# Patient Record
Sex: MALE | Race: WHITE | Employment: OTHER | URBAN - METROPOLITAN AREA
[De-identification: names, ages, dates, MRNs, and addresses within clinical notes are randomized per-mention and may not be internally consistent; named-entity substitution may affect disease eponyms.]

---

## 2019-10-08 ENCOUNTER — OFFICE VISIT (OUTPATIENT)
Dept: FAMILY MEDICINE CLINIC | Age: 74
End: 2019-10-08
Payer: MEDICARE

## 2019-10-08 VITALS
SYSTOLIC BLOOD PRESSURE: 130 MMHG | BODY MASS INDEX: 36.65 KG/M2 | DIASTOLIC BLOOD PRESSURE: 80 MMHG | WEIGHT: 285.6 LBS | HEART RATE: 65 BPM | OXYGEN SATURATION: 97 % | HEIGHT: 74 IN | RESPIRATION RATE: 20 BRPM | TEMPERATURE: 97.9 F

## 2019-10-08 DIAGNOSIS — Z23 NEED FOR PNEUMOCOCCAL VACCINATION: ICD-10-CM

## 2019-10-08 DIAGNOSIS — E03.9 HYPOTHYROIDISM (ACQUIRED): ICD-10-CM

## 2019-10-08 DIAGNOSIS — K21.9 GERD WITHOUT ESOPHAGITIS: ICD-10-CM

## 2019-10-08 DIAGNOSIS — I48.20 CHRONIC ATRIAL FIBRILLATION (HCC): ICD-10-CM

## 2019-10-08 DIAGNOSIS — Z23 NEED FOR TDAP VACCINATION: ICD-10-CM

## 2019-10-08 DIAGNOSIS — F32.4 MAJOR DEPRESSION SINGLE EPISODE, IN PARTIAL REMISSION (HCC): ICD-10-CM

## 2019-10-08 DIAGNOSIS — E78.00 HYPERCHOLESTEROLEMIA: ICD-10-CM

## 2019-10-08 DIAGNOSIS — Z11.59 ENCOUNTER FOR HEPATITIS C SCREENING TEST FOR LOW RISK PATIENT: ICD-10-CM

## 2019-10-08 DIAGNOSIS — E11.9 TYPE 2 DIABETES MELLITUS WITHOUT COMPLICATION, WITHOUT LONG-TERM CURRENT USE OF INSULIN (HCC): Primary | ICD-10-CM

## 2019-10-08 DIAGNOSIS — Z23 NEED FOR SHINGLES VACCINE: ICD-10-CM

## 2019-10-08 DIAGNOSIS — Z00.00 PREVENTATIVE HEALTH CARE: ICD-10-CM

## 2019-10-08 LAB
ALT SERPL-CCNC: 20 U/L (ref 10–40)
ANION GAP SERPL CALCULATED.3IONS-SCNC: 16 MMOL/L (ref 3–16)
AST SERPL-CCNC: 21 U/L (ref 15–37)
BUN BLDV-MCNC: 16 MG/DL (ref 7–20)
CALCIUM SERPL-MCNC: 10.3 MG/DL (ref 8.3–10.6)
CHLORIDE BLD-SCNC: 100 MMOL/L (ref 99–110)
CHOLESTEROL, TOTAL: 116 MG/DL (ref 0–199)
CO2: 25 MMOL/L (ref 21–32)
CREAT SERPL-MCNC: 0.9 MG/DL (ref 0.8–1.3)
CREATININE URINE: 150.3 MG/DL (ref 39–259)
GFR AFRICAN AMERICAN: >60
GFR NON-AFRICAN AMERICAN: >60
GLUCOSE BLD-MCNC: 111 MG/DL (ref 70–99)
HCT VFR BLD CALC: 44.4 % (ref 40.5–52.5)
HDLC SERPL-MCNC: 37 MG/DL (ref 40–60)
HEMOGLOBIN: 14.4 G/DL (ref 13.5–17.5)
HEPATITIS C ANTIBODY INTERPRETATION: NORMAL
LDL CHOLESTEROL CALCULATED: 64 MG/DL
MCH RBC QN AUTO: 33.1 PG (ref 26–34)
MCHC RBC AUTO-ENTMCNC: 32.5 G/DL (ref 31–36)
MCV RBC AUTO: 101.7 FL (ref 80–100)
MICROALBUMIN UR-MCNC: <1.2 MG/DL
MICROALBUMIN/CREAT UR-RTO: NORMAL MG/G (ref 0–30)
PDW BLD-RTO: 13 % (ref 12.4–15.4)
PLATELET # BLD: 136 K/UL (ref 135–450)
PMV BLD AUTO: 11.5 FL (ref 5–10.5)
POTASSIUM SERPL-SCNC: 4.4 MMOL/L (ref 3.5–5.1)
RBC # BLD: 4.36 M/UL (ref 4.2–5.9)
SODIUM BLD-SCNC: 141 MMOL/L (ref 136–145)
TRIGL SERPL-MCNC: 75 MG/DL (ref 0–150)
TSH SERPL DL<=0.05 MIU/L-ACNC: 1.71 UIU/ML (ref 0.27–4.2)
VLDLC SERPL CALC-MCNC: 15 MG/DL
WBC # BLD: 4.9 K/UL (ref 4–11)

## 2019-10-08 PROCEDURE — G8427 DOCREV CUR MEDS BY ELIG CLIN: HCPCS | Performed by: FAMILY MEDICINE

## 2019-10-08 PROCEDURE — 3046F HEMOGLOBIN A1C LEVEL >9.0%: CPT | Performed by: FAMILY MEDICINE

## 2019-10-08 PROCEDURE — G8482 FLU IMMUNIZE ORDER/ADMIN: HCPCS | Performed by: FAMILY MEDICINE

## 2019-10-08 PROCEDURE — 3017F COLORECTAL CA SCREEN DOC REV: CPT | Performed by: FAMILY MEDICINE

## 2019-10-08 PROCEDURE — 4040F PNEUMOC VAC/ADMIN/RCVD: CPT | Performed by: FAMILY MEDICINE

## 2019-10-08 PROCEDURE — 36415 COLL VENOUS BLD VENIPUNCTURE: CPT | Performed by: FAMILY MEDICINE

## 2019-10-08 PROCEDURE — 2022F DILAT RTA XM EVC RTNOPTHY: CPT | Performed by: FAMILY MEDICINE

## 2019-10-08 PROCEDURE — 99203 OFFICE O/P NEW LOW 30 MIN: CPT | Performed by: FAMILY MEDICINE

## 2019-10-08 PROCEDURE — 1123F ACP DISCUSS/DSCN MKR DOCD: CPT | Performed by: FAMILY MEDICINE

## 2019-10-08 PROCEDURE — 1036F TOBACCO NON-USER: CPT | Performed by: FAMILY MEDICINE

## 2019-10-08 PROCEDURE — G8417 CALC BMI ABV UP PARAM F/U: HCPCS | Performed by: FAMILY MEDICINE

## 2019-10-08 RX ORDER — RIVAROXABAN 20 MG/1
20 TABLET, FILM COATED ORAL
Refills: 5 | COMMUNITY
Start: 2019-09-30 | End: 2019-11-29 | Stop reason: SDUPTHER

## 2019-10-08 RX ORDER — SIMVASTATIN 40 MG
40 TABLET ORAL NIGHTLY
COMMUNITY
Start: 2019-08-25 | End: 2021-07-21 | Stop reason: SDUPTHER

## 2019-10-08 RX ORDER — TIZANIDINE 4 MG/1
4 TABLET ORAL
COMMUNITY
Start: 2018-10-02 | End: 2019-10-08

## 2019-10-08 RX ORDER — SOTALOL HYDROCHLORIDE 80 MG/1
80 TABLET ORAL 2 TIMES DAILY
COMMUNITY
Start: 2019-08-30 | End: 2021-07-21 | Stop reason: SDUPTHER

## 2019-10-08 RX ORDER — EMPAGLIFLOZIN 25 MG/1
25 TABLET, FILM COATED ORAL DAILY
Refills: 4 | COMMUNITY
Start: 2019-09-30 | End: 2019-11-29 | Stop reason: SDUPTHER

## 2019-10-08 RX ORDER — GLIMEPIRIDE 2 MG/1
2 TABLET ORAL
COMMUNITY
Start: 2019-08-22 | End: 2019-11-29 | Stop reason: SDUPTHER

## 2019-10-08 RX ORDER — LEVOTHYROXINE SODIUM 0.05 MG/1
50 TABLET ORAL DAILY
Refills: 4 | COMMUNITY
Start: 2019-09-30 | End: 2019-11-29 | Stop reason: SDUPTHER

## 2019-10-08 RX ORDER — KETOCONAZOLE 20 MG/ML
SHAMPOO TOPICAL
COMMUNITY
End: 2019-10-08

## 2019-10-08 RX ORDER — ASCORBIC ACID 500 MG
500 TABLET ORAL
COMMUNITY
End: 2019-10-08

## 2019-10-08 RX ORDER — CITALOPRAM 40 MG/1
20 TABLET ORAL DAILY
COMMUNITY
Start: 2019-09-13 | End: 2019-11-29 | Stop reason: SDUPTHER

## 2019-10-08 RX ORDER — PANTOPRAZOLE SODIUM 20 MG/1
20 TABLET, DELAYED RELEASE ORAL DAILY
Refills: 0 | COMMUNITY
Start: 2019-09-30 | End: 2019-11-29 | Stop reason: SDUPTHER

## 2019-10-08 ASSESSMENT — PATIENT HEALTH QUESTIONNAIRE - PHQ9
SUM OF ALL RESPONSES TO PHQ9 QUESTIONS 1 & 2: 0
1. LITTLE INTEREST OR PLEASURE IN DOING THINGS: 0
SUM OF ALL RESPONSES TO PHQ QUESTIONS 1-9: 0
2. FEELING DOWN, DEPRESSED OR HOPELESS: 0
SUM OF ALL RESPONSES TO PHQ QUESTIONS 1-9: 0

## 2019-10-08 ASSESSMENT — ENCOUNTER SYMPTOMS
WHEEZING: 0
BLOOD IN STOOL: 0
RHINORRHEA: 0
SORE THROAT: 0
COUGH: 0
VOMITING: 0
CONSTIPATION: 0
SHORTNESS OF BREATH: 0
DIARRHEA: 0
NAUSEA: 0
ABDOMINAL PAIN: 0

## 2019-10-09 LAB
ESTIMATED AVERAGE GLUCOSE: 137 MG/DL
HBA1C MFR BLD: 6.4 %

## 2019-11-29 RX ORDER — LEVOTHYROXINE SODIUM 0.05 MG/1
TABLET ORAL
Qty: 90 TABLET | Refills: 0 | Status: SHIPPED | OUTPATIENT
Start: 2019-11-29 | End: 2020-01-13

## 2019-11-29 RX ORDER — RIVAROXABAN 20 MG/1
TABLET, FILM COATED ORAL
Qty: 90 TABLET | Refills: 0 | Status: SHIPPED | OUTPATIENT
Start: 2019-11-29 | End: 2020-03-03

## 2019-11-29 RX ORDER — EMPAGLIFLOZIN 25 MG/1
TABLET, FILM COATED ORAL
Qty: 90 TABLET | Refills: 0 | Status: SHIPPED | OUTPATIENT
Start: 2019-11-29 | End: 2020-01-13

## 2019-11-29 RX ORDER — CITALOPRAM 40 MG/1
TABLET ORAL
Qty: 45 TABLET | Refills: 0 | Status: SHIPPED | OUTPATIENT
Start: 2019-11-29 | End: 2020-01-13

## 2019-11-29 RX ORDER — PANTOPRAZOLE SODIUM 20 MG/1
TABLET, DELAYED RELEASE ORAL
Qty: 90 TABLET | Refills: 0 | Status: SHIPPED | OUTPATIENT
Start: 2019-11-29 | End: 2020-04-23

## 2019-11-29 RX ORDER — GLIMEPIRIDE 2 MG/1
TABLET ORAL
Qty: 90 TABLET | Refills: 0 | Status: SHIPPED | OUTPATIENT
Start: 2019-11-29 | End: 2020-01-13

## 2020-02-11 ENCOUNTER — OFFICE VISIT (OUTPATIENT)
Dept: FAMILY MEDICINE CLINIC | Age: 75
End: 2020-02-11
Payer: MEDICARE

## 2020-02-11 VITALS
BODY MASS INDEX: 37.09 KG/M2 | WEIGHT: 289 LBS | SYSTOLIC BLOOD PRESSURE: 120 MMHG | HEIGHT: 74 IN | DIASTOLIC BLOOD PRESSURE: 80 MMHG

## 2020-02-11 LAB — HBA1C MFR BLD: 6.6 %

## 2020-02-11 PROCEDURE — G8427 DOCREV CUR MEDS BY ELIG CLIN: HCPCS | Performed by: FAMILY MEDICINE

## 2020-02-11 PROCEDURE — G8417 CALC BMI ABV UP PARAM F/U: HCPCS | Performed by: FAMILY MEDICINE

## 2020-02-11 PROCEDURE — 1036F TOBACCO NON-USER: CPT | Performed by: FAMILY MEDICINE

## 2020-02-11 PROCEDURE — G8482 FLU IMMUNIZE ORDER/ADMIN: HCPCS | Performed by: FAMILY MEDICINE

## 2020-02-11 PROCEDURE — 99214 OFFICE O/P EST MOD 30 MIN: CPT | Performed by: FAMILY MEDICINE

## 2020-02-11 PROCEDURE — 3017F COLORECTAL CA SCREEN DOC REV: CPT | Performed by: FAMILY MEDICINE

## 2020-02-11 PROCEDURE — 4040F PNEUMOC VAC/ADMIN/RCVD: CPT | Performed by: FAMILY MEDICINE

## 2020-02-11 PROCEDURE — 1123F ACP DISCUSS/DSCN MKR DOCD: CPT | Performed by: FAMILY MEDICINE

## 2020-02-11 PROCEDURE — 2022F DILAT RTA XM EVC RTNOPTHY: CPT | Performed by: FAMILY MEDICINE

## 2020-02-11 PROCEDURE — 3044F HG A1C LEVEL LT 7.0%: CPT | Performed by: FAMILY MEDICINE

## 2020-02-11 PROCEDURE — 83036 HEMOGLOBIN GLYCOSYLATED A1C: CPT | Performed by: FAMILY MEDICINE

## 2020-03-02 ENCOUNTER — TELEPHONE (OUTPATIENT)
Dept: FAMILY MEDICINE CLINIC | Age: 75
End: 2020-03-02

## 2020-03-06 ENCOUNTER — TELEPHONE (OUTPATIENT)
Dept: FAMILY MEDICINE CLINIC | Age: 75
End: 2020-03-06

## 2020-06-09 NOTE — PROGRESS NOTES
Negative for chest pain. Endocrine: Negative for polydipsia and polyuria. /82 (Site: Left Upper Arm)   Ht 6' 2\" (1.88 m)   Wt 288 lb (130.6 kg)   BMI 36.98 kg/m²    Objective:   Physical Exam  Vitals signs reviewed. Constitutional:       General: He is not in acute distress. Appearance: He is well-developed. HENT:      Head: Normocephalic. Right Ear: External ear normal.      Left Ear: External ear normal.   Neck:      Thyroid: No thyromegaly. Vascular: No carotid bruit or JVD. Cardiovascular:      Rate and Rhythm: Normal rate and regular rhythm. Heart sounds: Normal heart sounds. No murmur. Pulmonary:      Effort: Pulmonary effort is normal.      Breath sounds: Normal breath sounds. No wheezing or rales. Abdominal:      Comments: Obese. Lymphadenopathy:      Cervical: No cervical adenopathy. Neurological:      Mental Status: He is alert and oriented to person, place, and time. Assessment:      Type 2 Diabetes  Hyperlipidemia  Hypothyroidism   Depression  Anxiety  GERD  Chronic Atrial Fibrillation   Obesity       Plan:      HgbA1C was 6.5 today (down from 6.6 last time)  Reminded patient to have an eye exam  Refilled medication  Reminded patient to have a Colonoscopy   Reminded patient to have an AWV with our nurse.     RTO 4 months for Diabetes         VIPIN LANDIN DO

## 2020-06-11 ENCOUNTER — OFFICE VISIT (OUTPATIENT)
Dept: FAMILY MEDICINE CLINIC | Age: 75
End: 2020-06-11
Payer: MEDICARE

## 2020-06-11 VITALS
HEIGHT: 74 IN | WEIGHT: 288 LBS | SYSTOLIC BLOOD PRESSURE: 124 MMHG | BODY MASS INDEX: 36.96 KG/M2 | DIASTOLIC BLOOD PRESSURE: 82 MMHG

## 2020-06-11 LAB — HBA1C MFR BLD: 6.5 %

## 2020-06-11 PROCEDURE — 4040F PNEUMOC VAC/ADMIN/RCVD: CPT | Performed by: FAMILY MEDICINE

## 2020-06-11 PROCEDURE — G8427 DOCREV CUR MEDS BY ELIG CLIN: HCPCS | Performed by: FAMILY MEDICINE

## 2020-06-11 PROCEDURE — 3044F HG A1C LEVEL LT 7.0%: CPT | Performed by: FAMILY MEDICINE

## 2020-06-11 PROCEDURE — 99214 OFFICE O/P EST MOD 30 MIN: CPT | Performed by: FAMILY MEDICINE

## 2020-06-11 PROCEDURE — 83036 HEMOGLOBIN GLYCOSYLATED A1C: CPT | Performed by: FAMILY MEDICINE

## 2020-06-11 PROCEDURE — 1036F TOBACCO NON-USER: CPT | Performed by: FAMILY MEDICINE

## 2020-06-11 PROCEDURE — 3017F COLORECTAL CA SCREEN DOC REV: CPT | Performed by: FAMILY MEDICINE

## 2020-06-11 PROCEDURE — 1123F ACP DISCUSS/DSCN MKR DOCD: CPT | Performed by: FAMILY MEDICINE

## 2020-06-11 PROCEDURE — G8417 CALC BMI ABV UP PARAM F/U: HCPCS | Performed by: FAMILY MEDICINE

## 2020-06-11 PROCEDURE — 2022F DILAT RTA XM EVC RTNOPTHY: CPT | Performed by: FAMILY MEDICINE

## 2020-06-11 RX ORDER — LEVOTHYROXINE SODIUM 0.05 MG/1
TABLET ORAL
Qty: 90 TABLET | Refills: 1 | Status: SHIPPED | OUTPATIENT
Start: 2020-06-11 | End: 2020-12-10

## 2020-06-11 RX ORDER — EMPAGLIFLOZIN 25 MG/1
TABLET, FILM COATED ORAL
Qty: 90 TABLET | Refills: 1 | Status: SHIPPED | OUTPATIENT
Start: 2020-06-11 | End: 2020-12-28 | Stop reason: SDUPTHER

## 2020-06-11 RX ORDER — CITALOPRAM 40 MG/1
TABLET ORAL
Qty: 45 TABLET | Refills: 1 | Status: SHIPPED | OUTPATIENT
Start: 2020-06-11 | End: 2021-02-23

## 2020-06-11 RX ORDER — PANTOPRAZOLE SODIUM 20 MG/1
TABLET, DELAYED RELEASE ORAL
Qty: 90 TABLET | Refills: 1 | Status: SHIPPED | OUTPATIENT
Start: 2020-06-11 | End: 2020-07-27

## 2020-06-11 RX ORDER — GLIMEPIRIDE 2 MG/1
TABLET ORAL
Qty: 90 TABLET | Refills: 1 | Status: SHIPPED | OUTPATIENT
Start: 2020-06-11 | End: 2020-09-04

## 2020-07-27 ENCOUNTER — TELEPHONE (OUTPATIENT)
Dept: FAMILY MEDICINE CLINIC | Age: 75
End: 2020-07-27

## 2020-07-27 RX ORDER — TIZANIDINE 4 MG/1
4 TABLET ORAL EVERY 8 HOURS PRN
Qty: 30 TABLET | Refills: 0 | Status: SHIPPED | OUTPATIENT
Start: 2020-07-27 | End: 2020-08-03

## 2020-07-27 NOTE — TELEPHONE ENCOUNTER
Pt called to ask Dr Vega Reason to prescribe a muscle relaxer. He has had a stiff neck for eight days. He tried hot and cold but it is not working. Please give pt a call  [phone].  [address] [phone]

## 2020-08-03 RX ORDER — TIZANIDINE 4 MG/1
TABLET ORAL
Qty: 30 TABLET | Refills: 0 | Status: SHIPPED | OUTPATIENT
Start: 2020-08-03 | End: 2020-08-10

## 2020-08-10 RX ORDER — TIZANIDINE 4 MG/1
TABLET ORAL
Qty: 30 TABLET | Refills: 0 | Status: SHIPPED | OUTPATIENT
Start: 2020-08-10 | End: 2020-08-17

## 2020-08-17 RX ORDER — TIZANIDINE 4 MG/1
TABLET ORAL
Qty: 30 TABLET | Refills: 0 | Status: SHIPPED | OUTPATIENT
Start: 2020-08-17 | End: 2020-10-06

## 2020-09-04 RX ORDER — GLIMEPIRIDE 2 MG/1
TABLET ORAL
Qty: 90 TABLET | Refills: 1 | Status: SHIPPED | OUTPATIENT
Start: 2020-09-04 | End: 2021-02-10

## 2020-09-21 ENCOUNTER — NURSE TRIAGE (OUTPATIENT)
Dept: OTHER | Facility: CLINIC | Age: 75
End: 2020-09-21

## 2020-09-21 NOTE — TELEPHONE ENCOUNTER
Reason for Disposition   Redness of the skin and no fever    Answer Assessment - Initial Assessment Questions  1. ONSET: \"When did the pain start? \"       yesterday  2. LOCATION: \"Where is the pain located? \"       Left foot  3. PAIN: \"How bad is the pain? \"    (Scale 1-10; or mild, moderate, severe)   - MILD (1-3): doesn't interfere with normal activities    - MODERATE (4-7): interferes with normal activities (e.g., work or school) or awakens from sleep, limping    - SEVERE (8-10): excruciating pain, unable to do any normal activities, unable to walk       7-8 yesterday, 3 today  4. WORK OR EXERCISE: \"Has there been any recent work or exercise that involved this part of the body? \"       *No Answer*  5. CAUSE: \"What do you think is causing the ankle pain? \"      *No Answer*  6. OTHER SYMPTOMS: \"Do you have any other symptoms? \" (e.g., calf pain, rash, fever, swelling)      swelling  7. PREGNANCY: \"Is there any chance you are pregnant? \" \"When was your last menstrual period? \"      *No Answer*    Protocols used: ANKLE PAIN-ADULT-[place]    Call transferred to [facility]

## 2020-09-22 ENCOUNTER — OFFICE VISIT (OUTPATIENT)
Dept: FAMILY MEDICINE CLINIC | Age: 75
End: 2020-09-22
Payer: MEDICARE

## 2020-09-22 VITALS
DIASTOLIC BLOOD PRESSURE: 80 MMHG | TEMPERATURE: 97.7 F | WEIGHT: 289.4 LBS | HEIGHT: 74 IN | BODY MASS INDEX: 37.14 KG/M2 | SYSTOLIC BLOOD PRESSURE: 132 MMHG

## 2020-09-22 PROBLEM — E66.01 MORBIDLY OBESE (HCC): Status: ACTIVE | Noted: 2020-09-22

## 2020-09-22 LAB — URIC ACID, SERUM: 4.6 MG/DL (ref 3.5–7.2)

## 2020-09-22 PROCEDURE — 36415 COLL VENOUS BLD VENIPUNCTURE: CPT | Performed by: FAMILY MEDICINE

## 2020-09-22 PROCEDURE — 3017F COLORECTAL CA SCREEN DOC REV: CPT | Performed by: FAMILY MEDICINE

## 2020-09-22 PROCEDURE — G8427 DOCREV CUR MEDS BY ELIG CLIN: HCPCS | Performed by: FAMILY MEDICINE

## 2020-09-22 PROCEDURE — 1036F TOBACCO NON-USER: CPT | Performed by: FAMILY MEDICINE

## 2020-09-22 PROCEDURE — 1123F ACP DISCUSS/DSCN MKR DOCD: CPT | Performed by: FAMILY MEDICINE

## 2020-09-22 PROCEDURE — 99213 OFFICE O/P EST LOW 20 MIN: CPT | Performed by: FAMILY MEDICINE

## 2020-09-22 PROCEDURE — 4040F PNEUMOC VAC/ADMIN/RCVD: CPT | Performed by: FAMILY MEDICINE

## 2020-09-22 PROCEDURE — G8417 CALC BMI ABV UP PARAM F/U: HCPCS | Performed by: FAMILY MEDICINE

## 2020-09-22 RX ORDER — PANTOPRAZOLE SODIUM 20 MG/1
TABLET, DELAYED RELEASE ORAL
Qty: 90 TABLET | Refills: 0 | Status: CANCELLED | OUTPATIENT
Start: 2020-09-22

## 2020-09-22 RX ORDER — SIMVASTATIN 40 MG
40 TABLET ORAL NIGHTLY
Qty: 30 TABLET | Refills: 2 | Status: CANCELLED | OUTPATIENT
Start: 2020-09-22

## 2020-09-22 ASSESSMENT — ENCOUNTER SYMPTOMS
ABDOMINAL PAIN: 0
RHINORRHEA: 0
COLOR CHANGE: 1
NAUSEA: 0
DIARRHEA: 0
VOMITING: 0
SORE THROAT: 0

## 2020-09-22 NOTE — PROGRESS NOTES
2020     Magalis Paz (:  1945) is a 70 y.o. male, here for evaluation of the following medical concerns:    HPI     Patient presented for left ankle pain, unsure how he injured it, feels like sprained ankle, patient has pain with movement, extension of left foot, patient points to the anterior lateral aspect of his foot where the majority of the pain is located. The patient denied a history of gout but acknowledged edema and erythema extending into this foot. Today, denied chest pain, sob,n v, or diarrhea. Review of Systems   Constitutional: Negative for activity change, fatigue, fever and unexpected weight change. HENT: Negative for congestion, ear pain, rhinorrhea and sore throat. Cardiovascular: Negative for chest pain, palpitations and leg swelling. Gastrointestinal: Negative for abdominal pain, diarrhea, nausea and vomiting. Musculoskeletal: Positive for arthralgias, gait problem and joint swelling. Skin: Positive for color change. Negative for rash. Prior to Visit Medications    Medication Sig Taking?  Authorizing Provider   glimepiride (AMARYL) 2 MG tablet TAKE 1 TABLET BY MOUTH EVERY DAY Yes Miriam Dowell,    tiZANidine (ZANAFLEX) 4 MG tablet TAKE 1 TABLET BY MOUTH EVERY 8 HOURS AS NEEDED FOR SPASM Yes Marcin Sousa DO   pantoprazole (PROTONIX) 20 MG tablet TAKE 1 TABLET BY MOUTH EVERY NIGHT AT BEDTIME Yes Ben Sousa DO   citalopram (CELEXA) 40 MG tablet TAKE 1/2 TABLET BY MOUTH EVERY DAY Yes Ben Sousa DO   rivaroxaban (XARELTO) 20 MG TABS tablet TAKE 1 TABLET BY MOUTH EVERY DAY Yes Ben Sousa DO   levothyroxine (SYNTHROID) 50 MCG tablet TAKE 1 TABLET BY MOUTH EVERY DAY Yes Marcin Sousa DO   empagliflozin (JARDIANCE) 25 MG tablet TAKE 1 TABLET BY MOUTH EVERY DAY Yes Marcin Sousa, DO   blood glucose test strips (ASCENSIA AUTODISC VI;ONE TOUCH ULTRA TEST VI) strip Test blood sugar qd dx code E11.9 non insulin dept Yes Historical Provider, MD   simvastatin (ZOCOR) 40 MG tablet Take 40 mg by mouth nightly  Yes Historical Provider, MD   sotalol (BETAPACE) 80 MG tablet Take 80 mg by mouth 2 times daily  Yes Historical Provider, MD        Social History     Tobacco Use    Smoking status: Never Smoker    Smokeless tobacco: Never Used   Substance Use Topics    Alcohol use: Yes     Comment: Occasional        Vitals:    09/22/20 1459   BP: 132/80   Temp: 97.7 °F (36.5 °C)   Weight: 289 lb 6.4 oz (131.3 kg)   Height: 6' 2\" (1.88 m)     Estimated body mass index is 37.16 kg/m² as calculated from the following:    Height as of this encounter: 6' 2\" (1.88 m). Weight as of this encounter: 289 lb 6.4 oz (131.3 kg). Physical Exam  Vitals signs and nursing note reviewed. HENT:      Head: Normocephalic and atraumatic. Right Ear: External ear normal.      Left Ear: External ear normal.   Cardiovascular:      Heart sounds: Normal heart sounds. No murmur. Pulmonary:      Breath sounds: Normal breath sounds. No wheezing. Musculoskeletal:         General: Swelling and tenderness present. Comments: Left ankle swelling  Neg. Drawer test  Pain with palpation in the area of anterior talofibular ligament    Neurological:      Mental Status: He is alert and oriented to person, place, and time. Psychiatric:         Behavior: Behavior normal.         ASSESSMENT/PLAN:  1. Acute left ankle pain  Patient will use otc medication  he was educated on the medication and its use. he will  Use ice, heat, and stretching. he was shown stretching exercises. he will push fluids and rest.   RTC if not improved. - Uric Acid      Return for with pcp if not improved.

## 2020-10-06 NOTE — PROGRESS NOTES
Subjective:      Patient ID: Jacob Spence is a 70 y.o. male. Diabetes   He presents for his follow-up diabetic visit. He has type 2 diabetes mellitus. His disease course has been stable. There are no hypoglycemic associated symptoms. Pertinent negatives for diabetes include no chest pain, no foot paresthesias, no foot ulcerations, no polydipsia and no polyuria. There are no hypoglycemic complications. Symptoms are stable. There are no diabetic complications. Risk factors for coronary artery disease include diabetes mellitus, dyslipidemia, family history, male sex and obesity. Current diabetic treatment includes oral agent (dual therapy). He is compliant with treatment all of the time. His weight is stable. He is following a generally healthy diet. He participates in exercise intermittently. Home blood sugar record trend: Patient has not been testing his sugars. An ACE inhibitor/angiotensin II receptor blocker is not being taken. Eye exam is not current. Hyperlipidemia:  Patient is tolerating and compliant with Simvastatin 40 mg daily. He is fasting today for a lipid recheck. Hypothyroidism:  Patient is tolerating and compliant with Synthroid 50 mcg daily. He is due today for a TSH recheck. Depression / Anxiety: Patient is tolerating and compliant with Celexa 40 mg 1/2 daily. He feels that the medication is helping with his symptoms. He is sleeping well and denies any suicidal ideation. He feels that he still needs to be on the medication. GERD:  Patient is tolerating and compliant with Protonix 20 mg daily. He feels that the medication completely controls his symptoms. Chronic Atrial Fibrillation:  Patient sees Dr. Aylin Garcia in [place] and is S/P pacemaker insertion. He takes Xarelto 20 mg daily and Betapace 80 mg BID. Obesity:  Patient weighed 288 at his visit on 6-11-20. He has lost 4 lbs since then. Review of Systems   Cardiovascular: Negative for chest pain. Endocrine: Negative for polydipsia and polyuria. /74 (Site: Right Upper Arm)   Temp 97.4 °F (36.3 °C) (Infrared)   Ht 6' 2\" (1.88 m)   Wt 284 lb (128.8 kg)   BMI 36.46 kg/m²    Objective:   Physical Exam  Vitals signs reviewed. Constitutional:       General: He is not in acute distress. Appearance: He is well-developed. HENT:      Head: Normocephalic. Right Ear: External ear normal.      Left Ear: External ear normal.   Neck:      Thyroid: No thyromegaly. Vascular: No carotid bruit or JVD. Cardiovascular:      Rate and Rhythm: Normal rate and regular rhythm. Heart sounds: Normal heart sounds. No murmur. Pulmonary:      Effort: Pulmonary effort is normal.      Breath sounds: Normal breath sounds. No wheezing or rales. Abdominal:      Comments: Obese. Lymphadenopathy:      Cervical: No cervical adenopathy. Neurological:      Mental Status: He is alert and oriented to person, place, and time. Comments: Bilateral foot exam shows no lesions. Bilateral monofilament exam is normal.           Assessment:      Type 2 Diabetes  Hyperlipidemia  Hypothyroidism   Depression  Anxiety  GERD  Chronic Atrial Fibrillation   Obesity       Plan:      HgbA1C, Chem 7, Lipid Panel, AST, ALT, TSH  Urine Microalbumin  Foot Exam Done  Reminded patient to have an eye exam  Refilled medication  Flu Shot Given  Reminded patient to have a Colonoscopy   Reminded patient to have an AWV with our nurse.     RTO 4 months for Diabetes         VIPIN LANDIN DO

## 2020-10-08 ENCOUNTER — OFFICE VISIT (OUTPATIENT)
Dept: FAMILY MEDICINE CLINIC | Age: 75
End: 2020-10-08
Payer: MEDICARE

## 2020-10-08 VITALS
HEIGHT: 74 IN | DIASTOLIC BLOOD PRESSURE: 74 MMHG | TEMPERATURE: 97.4 F | SYSTOLIC BLOOD PRESSURE: 116 MMHG | BODY MASS INDEX: 36.45 KG/M2 | WEIGHT: 284 LBS

## 2020-10-08 LAB
ALT SERPL-CCNC: 24 U/L (ref 10–40)
ANION GAP SERPL CALCULATED.3IONS-SCNC: 14 MMOL/L (ref 3–16)
AST SERPL-CCNC: 21 U/L (ref 15–37)
BUN BLDV-MCNC: 12 MG/DL (ref 7–20)
CALCIUM SERPL-MCNC: 10.2 MG/DL (ref 8.3–10.6)
CHLORIDE BLD-SCNC: 99 MMOL/L (ref 99–110)
CHOLESTEROL, TOTAL: 126 MG/DL (ref 0–199)
CO2: 25 MMOL/L (ref 21–32)
CREAT SERPL-MCNC: 0.9 MG/DL (ref 0.8–1.3)
GFR AFRICAN AMERICAN: >60
GFR NON-AFRICAN AMERICAN: >60
GLUCOSE BLD-MCNC: 134 MG/DL (ref 70–99)
HDLC SERPL-MCNC: 34 MG/DL (ref 40–60)
LDL CHOLESTEROL CALCULATED: 70 MG/DL
POTASSIUM SERPL-SCNC: 4.5 MMOL/L (ref 3.5–5.1)
SODIUM BLD-SCNC: 138 MMOL/L (ref 136–145)
TRIGL SERPL-MCNC: 109 MG/DL (ref 0–150)
TSH SERPL DL<=0.05 MIU/L-ACNC: 3.2 UIU/ML (ref 0.27–4.2)
VLDLC SERPL CALC-MCNC: 22 MG/DL

## 2020-10-08 PROCEDURE — 36415 COLL VENOUS BLD VENIPUNCTURE: CPT | Performed by: FAMILY MEDICINE

## 2020-10-08 PROCEDURE — 99214 OFFICE O/P EST MOD 30 MIN: CPT | Performed by: FAMILY MEDICINE

## 2020-10-08 PROCEDURE — G8427 DOCREV CUR MEDS BY ELIG CLIN: HCPCS | Performed by: FAMILY MEDICINE

## 2020-10-08 PROCEDURE — G8484 FLU IMMUNIZE NO ADMIN: HCPCS | Performed by: FAMILY MEDICINE

## 2020-10-08 PROCEDURE — 1036F TOBACCO NON-USER: CPT | Performed by: FAMILY MEDICINE

## 2020-10-08 PROCEDURE — G0008 ADMIN INFLUENZA VIRUS VAC: HCPCS | Performed by: FAMILY MEDICINE

## 2020-10-08 PROCEDURE — G8417 CALC BMI ABV UP PARAM F/U: HCPCS | Performed by: FAMILY MEDICINE

## 2020-10-08 PROCEDURE — 1123F ACP DISCUSS/DSCN MKR DOCD: CPT | Performed by: FAMILY MEDICINE

## 2020-10-08 PROCEDURE — 2022F DILAT RTA XM EVC RTNOPTHY: CPT | Performed by: FAMILY MEDICINE

## 2020-10-08 PROCEDURE — 4040F PNEUMOC VAC/ADMIN/RCVD: CPT | Performed by: FAMILY MEDICINE

## 2020-10-08 PROCEDURE — 3017F COLORECTAL CA SCREEN DOC REV: CPT | Performed by: FAMILY MEDICINE

## 2020-10-08 PROCEDURE — 90694 VACC AIIV4 NO PRSRV 0.5ML IM: CPT | Performed by: FAMILY MEDICINE

## 2020-10-08 PROCEDURE — 3044F HG A1C LEVEL LT 7.0%: CPT | Performed by: FAMILY MEDICINE

## 2020-10-08 RX ORDER — PANTOPRAZOLE SODIUM 20 MG/1
20 TABLET, DELAYED RELEASE ORAL DAILY
Qty: 90 TABLET | Refills: 1 | Status: SHIPPED | OUTPATIENT
Start: 2020-10-08 | End: 2021-04-12

## 2020-10-08 NOTE — PROGRESS NOTES
Vaccine Information Sheet, \"Influenza - Inactivated\"  given to Bernabe Catherine, or parent/legal guardian of  Bernabe Catherine and verbalized understanding. Patient responses:    Have you ever had a reaction to a flu vaccine? No  Do you have any current illness? No  Have you ever had Guillian [place] Syndrome? No  Do you have a serious allergy to any of the follow: Neomycin, Polymyxin, Thimerosal, eggs or egg products? No    Flu vaccine given per order. Please see immunization tab. Risks and benefits explained. Current VIS given.

## 2020-10-09 LAB
ESTIMATED AVERAGE GLUCOSE: 151.3 MG/DL
HBA1C MFR BLD: 6.9 %

## 2020-11-09 ENCOUNTER — TELEPHONE (OUTPATIENT)
Dept: FAMILY MEDICINE CLINIC | Age: 75
End: 2020-11-09

## 2020-12-09 ENCOUNTER — TELEPHONE (OUTPATIENT)
Dept: FAMILY MEDICINE CLINIC | Age: 75
End: 2020-12-09

## 2020-12-28 RX ORDER — EMPAGLIFLOZIN 25 MG/1
TABLET, FILM COATED ORAL
Qty: 90 TABLET | Refills: 0 | Status: SHIPPED | OUTPATIENT
Start: 2020-12-28 | End: 2021-03-25

## 2021-02-06 NOTE — PROGRESS NOTES
Subjective:      Patient ID: Emerita Huang is a 70 y.o. male. Diabetes  He presents for his follow-up diabetic visit. He has type 2 diabetes mellitus. His disease course has been stable. Hypoglycemia symptoms include nervousness/anxiousness. Pertinent negatives for diabetes include no chest pain, no foot paresthesias, no foot ulcerations, no polydipsia and no polyuria. There are no hypoglycemic complications. Symptoms are stable. There are no diabetic complications. Risk factors for coronary artery disease include diabetes mellitus, dyslipidemia, family history, male sex and obesity. Current diabetic treatment includes oral agent (dual therapy). He is compliant with treatment all of the time. His weight is increasing steadily. He is following a generally healthy diet. He participates in exercise intermittently. Home blood sugar record trend: Patient has not been testing his sugars. An ACE inhibitor/angiotensin II receptor blocker is not being taken. Eye exam is not current. Hyperlipidemia:  Patient is tolerating and compliant with Simvastatin 40 mg daily. His last lipid check was 10-8-20 and was at goal for LDL. Hypothyroidism:  Patient is tolerating and compliant with Synthroid 50 mcg daily. His TSH was normal on 10-8-20. Depression / Anxiety: Patient is tolerating and compliant with Celexa 40 mg 1/2 daily. He feels that the medication is helping with his symptoms. He is sleeping well and denies any suicidal ideation. He feels that he still needs to be on the medication. GERD:  Patient is tolerating and compliant with Protonix 20 mg daily. He feels that the medication completely controls his symptoms. Chronic Atrial Fibrillation:  Patient sees Dr. Diann Hunter in [place] and is S/P pacemaker insertion. He takes Xarelto 20 mg daily and Betapace 80 mg BID. Obesity:  Patient weighed 284 at his visit on 10-8-20. He has gained 7 lbs since then.           Review of Systems Cardiovascular: Negative for chest pain. Endocrine: Negative for polydipsia and polyuria. Psychiatric/Behavioral: Positive for dysphoric mood. Negative for suicidal ideas. The patient is nervous/anxious. /80 (Site: Left Upper Arm)   Temp 98 °F (36.7 °C) (Infrared)   Ht 6' 2\" (1.88 m)   Wt 291 lb (132 kg)   BMI 37.36 kg/m²    Objective:   Physical Exam  Vitals signs reviewed. Constitutional:       General: He is not in acute distress. Appearance: He is well-developed. HENT:      Head: Normocephalic. Right Ear: External ear normal.      Left Ear: External ear normal.   Neck:      Thyroid: No thyromegaly. Vascular: No carotid bruit or JVD. Cardiovascular:      Rate and Rhythm: Normal rate and regular rhythm. Heart sounds: Normal heart sounds. No murmur. Pulmonary:      Effort: Pulmonary effort is normal.      Breath sounds: Normal breath sounds. No wheezing or rales. Abdominal:      Comments: Obese. Lymphadenopathy:      Cervical: No cervical adenopathy. Neurological:      Mental Status: He is alert and oriented to person, place, and time. Assessment:      Type 2 Diabetes  Hyperlipidemia  Hypothyroidism   Depression  Anxiety  GERD  Chronic Atrial Fibrillation   Obesity       Plan:      HgbA1C was 7.0 today (up from 6.9 last time). Increase the Amaryl to 4 mg daily. Urine Microalbumin   Reminded patient to have an eye exam  Refilled medication  Rx given for Shingrix and Tdap shots at the pharmacy. Reminded patient to have a Colonoscopy   Reminded patient to have an AWV with our nurse.     RTO 4 months for Diabetes         VIPIN LANDIN DO

## 2021-02-10 ENCOUNTER — OFFICE VISIT (OUTPATIENT)
Dept: FAMILY MEDICINE CLINIC | Age: 76
End: 2021-02-10
Payer: MEDICARE

## 2021-02-10 VITALS
TEMPERATURE: 98 F | WEIGHT: 291 LBS | BODY MASS INDEX: 37.35 KG/M2 | DIASTOLIC BLOOD PRESSURE: 80 MMHG | SYSTOLIC BLOOD PRESSURE: 126 MMHG | HEIGHT: 74 IN

## 2021-02-10 DIAGNOSIS — E03.9 HYPOTHYROIDISM (ACQUIRED): ICD-10-CM

## 2021-02-10 DIAGNOSIS — K21.9 GERD WITHOUT ESOPHAGITIS: ICD-10-CM

## 2021-02-10 DIAGNOSIS — Z23 NEED FOR SHINGLES VACCINE: ICD-10-CM

## 2021-02-10 DIAGNOSIS — E78.00 HYPERCHOLESTEROLEMIA: ICD-10-CM

## 2021-02-10 DIAGNOSIS — Z23 NEED FOR TDAP VACCINATION: ICD-10-CM

## 2021-02-10 DIAGNOSIS — F32.4 MAJOR DEPRESSION SINGLE EPISODE, IN PARTIAL REMISSION (HCC): ICD-10-CM

## 2021-02-10 DIAGNOSIS — I48.20 CHRONIC ATRIAL FIBRILLATION (HCC): ICD-10-CM

## 2021-02-10 DIAGNOSIS — F41.9 ANXIETY: ICD-10-CM

## 2021-02-10 DIAGNOSIS — E11.9 TYPE 2 DIABETES MELLITUS WITHOUT COMPLICATION, WITHOUT LONG-TERM CURRENT USE OF INSULIN (HCC): Primary | ICD-10-CM

## 2021-02-10 DIAGNOSIS — E66.09 NON MORBID OBESITY DUE TO EXCESS CALORIES: ICD-10-CM

## 2021-02-10 LAB
CREATININE URINE: 104.6 MG/DL (ref 39–259)
HBA1C MFR BLD: 7 %
MICROALBUMIN UR-MCNC: <1.2 MG/DL
MICROALBUMIN/CREAT UR-RTO: NORMAL MG/G (ref 0–30)

## 2021-02-10 PROCEDURE — 3051F HG A1C>EQUAL 7.0%<8.0%: CPT | Performed by: FAMILY MEDICINE

## 2021-02-10 PROCEDURE — 83036 HEMOGLOBIN GLYCOSYLATED A1C: CPT | Performed by: FAMILY MEDICINE

## 2021-02-10 PROCEDURE — 99214 OFFICE O/P EST MOD 30 MIN: CPT | Performed by: FAMILY MEDICINE

## 2021-02-10 RX ORDER — PANTOPRAZOLE SODIUM 20 MG/1
20 TABLET, DELAYED RELEASE ORAL DAILY
COMMUNITY
End: 2021-04-12

## 2021-02-10 RX ORDER — GLIMEPIRIDE 4 MG/1
4 TABLET ORAL
COMMUNITY
End: 2021-03-07 | Stop reason: SDUPTHER

## 2021-02-10 RX ORDER — ZOSTER VACCINE RECOMBINANT, ADJUVANTED 50 MCG/0.5
0.5 KIT INTRAMUSCULAR ONCE
Qty: 0.5 ML | Refills: 0 | Status: SHIPPED | OUTPATIENT
Start: 2021-02-10 | End: 2021-02-10

## 2021-03-01 ENCOUNTER — OFFICE VISIT (OUTPATIENT)
Dept: FAMILY MEDICINE CLINIC | Age: 76
End: 2021-03-01
Payer: MEDICARE

## 2021-03-01 VITALS
HEIGHT: 74 IN | BODY MASS INDEX: 37.73 KG/M2 | WEIGHT: 294 LBS | DIASTOLIC BLOOD PRESSURE: 70 MMHG | TEMPERATURE: 97.5 F | SYSTOLIC BLOOD PRESSURE: 130 MMHG

## 2021-03-01 DIAGNOSIS — Z00.00 PREVENTATIVE HEALTH CARE: ICD-10-CM

## 2021-03-01 PROCEDURE — 99214 OFFICE O/P EST MOD 30 MIN: CPT | Performed by: FAMILY MEDICINE

## 2021-03-01 RX ORDER — LANCETS
EACH MISCELLANEOUS
Qty: 100 EACH | Refills: 3 | Status: SHIPPED | OUTPATIENT
Start: 2021-03-01

## 2021-03-01 RX ORDER — BLOOD-GLUCOSE METER
EACH MISCELLANEOUS
Qty: 1 KIT | Refills: 0 | Status: SHIPPED | OUTPATIENT
Start: 2021-03-01 | End: 2021-03-07

## 2021-03-01 ASSESSMENT — ENCOUNTER SYMPTOMS: SHORTNESS OF BREATH: 0

## 2021-03-01 NOTE — PROGRESS NOTES
Subjective:      Patient ID: Eveline Harrington is a 70 y.o. male. HPI     Dizziness:  Patient has a history of atrial fibrillation and is S/P pacemaker and takes Betapace 80 mg BID. He states that last PM, he began with dizziness episodes on standing up. He denies any vertigo and states that the episodes last only seconds. The dizziness was almost constant early this AM.   As of noon today, the symptoms have nearly resolved. There are no symptoms with change in head position. He denies any hearing loss or tinnitus or nausea. He has been monitoring his BP at home and there have been no low readings. Review of Systems   Constitutional: Negative for chills and fever. HENT: Negative for hearing loss and tinnitus. Respiratory: Negative for shortness of breath. Cardiovascular: Negative for chest pain, palpitations and leg swelling. Neurological: Positive for dizziness and light-headedness. /70 (Site: Left Upper Arm)   Temp 97.5 °F (36.4 °C) (Infrared)   Ht 6' 2\" (1.88 m)   Wt 294 lb (133.4 kg)   BMI 37.75 kg/m²    Objective:   Physical Exam  Vitals signs reviewed. Constitutional:       General: He is not in acute distress. Appearance: He is well-developed. HENT:      Head: Normocephalic. Right Ear: External ear normal.      Left Ear: External ear normal.   Neck:      Thyroid: No thyromegaly. Vascular: No carotid bruit or JVD. Cardiovascular:      Rate and Rhythm: Normal rate and regular rhythm. Heart sounds: Normal heart sounds. No murmur. Pulmonary:      Effort: Pulmonary effort is normal.      Breath sounds: Normal breath sounds. No wheezing or rales. Lymphadenopathy:      Cervical: No cervical adenopathy. Neurological:      Mental Status: He is alert and oriented to person, place, and time. Comments: No horizontal or vertical nystagmus.        BP lying 134/72,  Sitting 136/80,  Standing 132/82    Assessment:      Dizziness - Postural       Plan: Observe for further symptoms. May need to adjust his Sotolol if symptoms continue. I recommended slow position changes  Reminded patient to have an eye exam  Referral given for Colonoscopy   Recommended that patient have an AWV with our nurse.     RTO 3 months for Diabetes         VIPIN LANDIN, DO

## 2021-03-07 RX ORDER — GLIMEPIRIDE 4 MG/1
4 TABLET ORAL
Qty: 90 TABLET | Refills: 0 | Status: SHIPPED | OUTPATIENT
Start: 2021-03-07 | End: 2021-04-08 | Stop reason: SDUPTHER

## 2021-03-08 RX ORDER — LEVOTHYROXINE SODIUM 0.05 MG/1
50 TABLET ORAL DAILY
Qty: 90 TABLET | Refills: 0 | Status: SHIPPED | OUTPATIENT
Start: 2021-03-08 | End: 2021-03-16

## 2021-03-08 RX ORDER — CITALOPRAM 40 MG/1
20 TABLET ORAL DAILY
Qty: 45 TABLET | Refills: 0 | Status: SHIPPED | OUTPATIENT
Start: 2021-03-08 | End: 2021-04-08

## 2021-04-08 RX ORDER — EMPAGLIFLOZIN 25 MG/1
TABLET, FILM COATED ORAL
Qty: 90 TABLET | Refills: 0 | Status: SHIPPED | OUTPATIENT
Start: 2021-04-08 | End: 2021-07-19 | Stop reason: SDUPTHER

## 2021-04-08 RX ORDER — LEVOTHYROXINE SODIUM 0.05 MG/1
TABLET ORAL
Qty: 90 TABLET | Refills: 0 | Status: SHIPPED | OUTPATIENT
Start: 2021-04-08 | End: 2021-07-21 | Stop reason: SDUPTHER

## 2021-04-08 RX ORDER — GLIMEPIRIDE 4 MG/1
4 TABLET ORAL
Qty: 90 TABLET | Refills: 0 | Status: SHIPPED | OUTPATIENT
Start: 2021-04-08 | End: 2021-07-21 | Stop reason: SDUPTHER

## 2021-04-08 NOTE — TELEPHONE ENCOUNTER
Patient is calling is calling because 4 of his medications have been sent to the wrong pharmacy, he is out of some of these meds and going out of town tomorrow.  I have corrected the pharmacy in the chart if we could please resent    I have pended the meds with the correct pharmacy [facility]

## 2021-05-03 NOTE — PROGRESS NOTES
Subjective:      Patient ID: Lux Mcclendon is a 70 y.o. male. HPI     Sores on Right Lower Leg:  Patient developed 2 cutaneous abscesses on the right anterior lower leg 3 days ago. He has since lost skin and has had a significant amount of purulent drainage since then. Review of Systems   Constitutional: Negative for chills and fever. Skin: Positive for wound. /80 (Site: Right Upper Arm)   Ht 6' 2\" (1.88 m)   Wt 292 lb (132.5 kg)   BMI 37.49 kg/m²     Objective:   Physical Exam  Constitutional:       General: He is not in acute distress. Appearance: Normal appearance. He is not ill-appearing or toxic-appearing. HENT:      Head: Normocephalic. Skin:     Comments: Right anterior lower leg with a 2 by 7 cm area of superficial skin slough. There is no active bleeding or drainage noted. Neurological:      Mental Status: He is alert and oriented to person, place, and time. Assessment:      Skin Ulceration Right Leg      Plan: Will Rx Bactrim DS BID for 7 days. Referral given to wound management  Reminded patient to have an eye exam   Reminded patient to have a Colonoscopy   Rx given for Shingrix and Tdap shot at the pharmacy. Recommended that patient have an AWV with our nurse.     RTO 6 weeks for Diabetes         VIPIN LANDIN DO

## 2021-05-04 ENCOUNTER — OFFICE VISIT (OUTPATIENT)
Dept: FAMILY MEDICINE CLINIC | Age: 76
End: 2021-05-04
Payer: MEDICARE

## 2021-05-04 VITALS
WEIGHT: 292 LBS | DIASTOLIC BLOOD PRESSURE: 80 MMHG | BODY MASS INDEX: 37.47 KG/M2 | SYSTOLIC BLOOD PRESSURE: 126 MMHG | HEIGHT: 74 IN

## 2021-05-04 DIAGNOSIS — Z23 NEED FOR SHINGLES VACCINE: ICD-10-CM

## 2021-05-04 DIAGNOSIS — Z23 NEED FOR TDAP VACCINATION: ICD-10-CM

## 2021-05-04 DIAGNOSIS — Z00.00 PREVENTATIVE HEALTH CARE: ICD-10-CM

## 2021-05-04 DIAGNOSIS — L97.911 LEG ULCER, RIGHT, LIMITED TO BREAKDOWN OF SKIN (HCC): Primary | ICD-10-CM

## 2021-05-04 PROCEDURE — 99213 OFFICE O/P EST LOW 20 MIN: CPT | Performed by: FAMILY MEDICINE

## 2021-05-04 RX ORDER — SULFAMETHOXAZOLE AND TRIMETHOPRIM 800; 160 MG/1; MG/1
1 TABLET ORAL 2 TIMES DAILY
Qty: 14 TABLET | Refills: 0 | Status: SHIPPED | OUTPATIENT
Start: 2021-05-04 | End: 2021-05-11

## 2021-05-04 RX ORDER — ZOSTER VACCINE RECOMBINANT, ADJUVANTED 50 MCG/0.5
0.5 KIT INTRAMUSCULAR ONCE
Qty: 0.5 ML | Refills: 0 | Status: SHIPPED | OUTPATIENT
Start: 2021-05-04 | End: 2021-05-04

## 2021-05-06 ENCOUNTER — HOSPITAL ENCOUNTER (OUTPATIENT)
Dept: WOUND CARE | Age: 76
Discharge: HOME | End: 2021-05-06
Payer: MEDICARE

## 2021-05-06 VITALS
HEIGHT: 74 IN | RESPIRATION RATE: 18 BRPM | DIASTOLIC BLOOD PRESSURE: 86 MMHG | TEMPERATURE: 97.9 F | HEART RATE: 70 BPM | WEIGHT: 291.45 LBS | BODY MASS INDEX: 37.4 KG/M2 | SYSTOLIC BLOOD PRESSURE: 115 MMHG

## 2021-05-06 DIAGNOSIS — L97.212 NON-PRESSURE CHRONIC ULCER OF RIGHT CALF WITH FAT LAYER EXPOSED (HCC): Primary | ICD-10-CM

## 2021-05-06 PROCEDURE — 11045 DBRDMT SUBQ TISS EACH ADDL: CPT

## 2021-05-06 PROCEDURE — 99203 OFFICE O/P NEW LOW 30 MIN: CPT

## 2021-05-06 PROCEDURE — 11042 DBRDMT SUBQ TIS 1ST 20SQCM/<: CPT

## 2021-05-06 RX ORDER — BETAMETHASONE DIPROPIONATE 0.05 %
OINTMENT (GRAM) TOPICAL ONCE
Status: CANCELLED | OUTPATIENT
Start: 2021-05-06 | End: 2021-05-06

## 2021-05-06 RX ORDER — BACITRACIN, NEOMYCIN, POLYMYXIN B 400; 3.5; 5 [USP'U]/G; MG/G; [USP'U]/G
OINTMENT TOPICAL ONCE
Status: CANCELLED | OUTPATIENT
Start: 2021-05-06 | End: 2021-05-06

## 2021-05-06 RX ORDER — LIDOCAINE HYDROCHLORIDE 20 MG/ML
JELLY TOPICAL ONCE
Status: CANCELLED | OUTPATIENT
Start: 2021-05-06 | End: 2021-05-06

## 2021-05-06 RX ORDER — CLOBETASOL PROPIONATE 0.5 MG/G
OINTMENT TOPICAL ONCE
Status: CANCELLED | OUTPATIENT
Start: 2021-05-06 | End: 2021-05-06

## 2021-05-06 RX ORDER — BACITRACIN ZINC AND POLYMYXIN B SULFATE 500; 1000 [USP'U]/G; [USP'U]/G
OINTMENT TOPICAL ONCE
Status: CANCELLED | OUTPATIENT
Start: 2021-05-06 | End: 2021-05-06

## 2021-05-06 RX ORDER — LIDOCAINE 40 MG/G
CREAM TOPICAL ONCE
Status: CANCELLED | OUTPATIENT
Start: 2021-05-06 | End: 2021-05-06

## 2021-05-06 RX ORDER — GINSENG 100 MG
CAPSULE ORAL ONCE
Status: CANCELLED | OUTPATIENT
Start: 2021-05-06 | End: 2021-05-06

## 2021-05-06 RX ORDER — GENTAMICIN SULFATE 1 MG/G
OINTMENT TOPICAL ONCE
Status: CANCELLED | OUTPATIENT
Start: 2021-05-06 | End: 2021-05-06

## 2021-05-06 RX ORDER — LIDOCAINE HYDROCHLORIDE 40 MG/ML
SOLUTION TOPICAL ONCE
Status: COMPLETED | OUTPATIENT
Start: 2021-05-06 | End: 2021-05-06

## 2021-05-06 RX ORDER — LIDOCAINE HYDROCHLORIDE 40 MG/ML
SOLUTION TOPICAL ONCE
Status: CANCELLED | OUTPATIENT
Start: 2021-05-06 | End: 2021-05-06

## 2021-05-06 RX ORDER — LIDOCAINE 50 MG/G
OINTMENT TOPICAL ONCE
Status: CANCELLED | OUTPATIENT
Start: 2021-05-06 | End: 2021-05-06

## 2021-05-06 RX ADMIN — LIDOCAINE HYDROCHLORIDE 5 ML: 40 SOLUTION TOPICAL at 09:25

## 2021-05-06 NOTE — PLAN OF CARE
Multilayer Compression Wrap   (Not Unna) Below the Knee    NAME:  Luis Armando Guaman  YOB: 1945  MEDICAL RECORD NUMBER:  [id number]  DATE:  5/6/2021    Multilayer compression wrap: Applied moisturizing agent to dry skin as needed. Applied primary and secondary dressing as ordered. Applied multilayered dressing below the knee to right lower leg. Instructed patient/caregiver not to remove dressing and to keep it clean and dry. Instructed patient/caregiver on complications to report to provider, such as pain, numbness in toes, heavy drainage, and slippage of dressing. Instructed patient on purpose of compression dressing and on activity and exercise recommendations.       Electronically signed by Lakeisha Victor RN on 5/6/2021 at 10:38 AM

## 2021-05-06 NOTE — LETTER
[address]  [address] [address] [phone]  Dept: [phone]        Thank you Mr. Burton Grimes for choosing [facility] for your Wound Care needs. We hope you found your first visit both encouraging and educational.  We look forward to serving you throughout the healing process. Before your next visit please review the information you received in your [employer]. The first visit can be overwhelming and this is a useful tool to refresh what information you may have been given. If you find yourself with any questions prior to your upcoming appointment, please feel free to contact us. Often wounds can be challenging and it is our goal to see you through the healing process with as much support possible. Again, thank you for choosing [facility] [place]!     Sincerely,      The Staff of [address] [place] [address] Pkwy and [facility] Therapy

## 2021-05-06 NOTE — LETTER
[address]  [address] [facility] [address]  [phone]  Dept: [phone]     TODAYS DATE: 5/6/2021      [address] [place] [address],[address] Floor Wound Care   Appointment Treatment Guidelines  Welcome Mr. Brenda Rendon to the [address] [place] [address] Pkwy. We appreciate the confidence you have shown in choosing us as your wound care provider. Our goal is to heal your wound(s) as quickly as possible. Please read the items below regarding the nature of your appointments. 1. We will make every effort to schedule appointments that are convenient for you. Certain days and times may not be available, depending on your providers office hours and details of your care. 2. Patients will not necessarily be brought to an exam room in the order in which they arrive. Many providers work out of this office and patients are here for different procedures. Our goal is to serve you as quickly as possible. 3. We acknowledge that your time is valuable. Please remember that wound healing takes time and we appreciate your understanding that the length of each patients appointment will vary depending upon their need. 4. It is for your protection that we ask for insurance cards, photo ID, and new consent forms on your first visit and periodically throughout your treatment at all our facilities. 5. Wound Care treatment is known to be most effective when provided on a regular basis. Missed appointments, and not following the recommended plan of care can result in ineffective treatment and a poor outcome. If you find it difficult to keep appointments or to follow the recommended plan of care, it is your responsibility to let the staff know, so that we can work with you toward a solution. 6. If you need to miss an appointment, please call to let us know. We expect 24 hours notice for all cancellations.  We also expect missed visits to be rescheduled as soon as possible, preferably within the same week to promote the most effective healing time for your wound(s). 7. If you will be late for an appointment, please call our center to be sure that the provider can still see you when you arrive. If you are more than 15 minutes late your appointment may need to be rescheduled. 8. If two (2) appointments are missed without notifying us, your care plan may be discontinued. The same may happen if multiple visits are cancelled or rescheduled, even with notice. A missed visit is time when another patient, who also needs care, could have been seen. Thank you for your understanding and consideration.

## 2021-05-06 NOTE — PROGRESS NOTES
[address]   Progress Note and Procedure Note      [facility] RECORD NUMBER:  [id number]  AGE: 70 y.o. GENDER: male  : 1945  EPISODE DATE:  2021    Subjective:     Chief Complaint   Patient presents with    Wound Check     Initial Visit on Right Lower Leg; Patient noted on 21 2 blisters full of pus; Dr. Carmen Hager referred patient here. HISTORY of PRESENT ILLNESS GARRICK Valencia is a 70 y.o. male who presents today for wound/ulcer evaluation. History of Wound Context: Patient presents today with a chief complaint of \"blisters\" on his right lower extremity. Patient relates he woke up with them over the weekend and he popped them. He relates it has been draining since then. He was referred to this clinic by his primary care physician. Patient denies any constitutional symptoms.   Wound/Ulcer Pain Timing/Severity: none  Quality of pain: N/A  Severity:  0 / 10   Modifying Factors: None  Associated Signs/Symptoms: edema and drainage    Ulcer Identification:  Ulcer Type: venous    Contributing Factors: edema, venous stasis and diabetes    Acute Wound: N/A    PAST MEDICAL HISTORY        Diagnosis Date    Anxiety     Chronic atrial fibrillation (HCC)     S/P Pacer / On Xarelto and Betapace / Dr. Betty Pulliam GERD without esophagitis     Hypercholesterolemia     Hypothyroidism (acquired)     Major depression single episode, in partial remission ([address]     Non morbid obesity due to excess calories     SSS (sick sinus syndrome) (HCC)     S/P pacemaker / Dr. Kenya Palacios     Type 2 diabetes mellitus without complication, without long-term current use of insulin ([address]        PAST SURGICAL HISTORY    Past Surgical History:   Procedure Laterality Date    CARDIAC SURGERY      ABLATION    COLONOSCOPY      PACEMAKER PLACEMENT      TONSILLECTOMY AND ADENOIDECTOMY      TOTAL KNEE ARTHROPLASTY Right 4016    UMBILICAL HERNIA REPAIR      WISDOM TOOTH mcg by mouth daily      simvastatin (ZOCOR) 40 MG tablet Take 40 mg by mouth nightly       sotalol (BETAPACE) 80 MG tablet Take 80 mg by mouth 2 times daily        No current facility-administered medications on file prior to encounter. REVIEW OF SYSTEMS  Review of Systems    Pertinent items are noted in HPI. Review of Systems: A 12 point review of symptoms is unremarkable with the exception of the chief complaint. Patient specifically denies nausea, fever, vomiting, chills, shortness of breath, chest pain, abdominal pain, constipation or difficulty urinating. Objective:      /86   Pulse 70   Temp 97.9 °F (36.6 °C) (Temporal)   Resp 18   Ht 6' 2\" (1.88 m)   Wt 291 lb 7.2 oz (132.2 kg)   BMI 37.42 kg/m²     Wt Readings from Last 3 Encounters:   05/06/21 291 lb 7.2 oz (132.2 kg)   05/04/21 292 lb (132.5 kg)   03/01/21 294 lb (133.4 kg)       PHYSICAL EXAM  Physical Exam    DP/PT pulses weakly palpable bilaterally. Pedal pulses are strongly biphasic on hand-held Doppler. CFT brisk to all digits. Digits are pink and warm to the touch. Hair growth is sprse in appearance. Pitting edema noted, bilaterally right>left. No calf pain with palpation noted. Patient has brawny pigmentary changes noted on the bilateral lower extremities right greater than left. There are multiple varicosities noted on the bilateral lower extremities right greater than left. Epicritic sensation is grossly intact bilaterally. Negative clonus and babinski reflex is down going. An ulceration is noted on the anterior surface of the right shin. It is seeping clear serous drainage. Wound has fibrotic and nonviable tissue that extends down through and includes the subcutaneous tissue. After debridement the wound has a granular base. The wound does not probe or track to bone. Patient's nails x10 are extremely thick, yellow, crumbly, and elongated with subungual debris.   Upon debridement of the right hallux nail a subungual ulceration/superficial abscess was noted where the nail was rubbing against the distal tuft of the toe. This did not probe or track to bone. Patient's muscle strength is intact and symmetrical for all groups tested bilateral lower extremities. Assessment:        Problem List Items Addressed This Visit     Non-pressure chronic ulcer of right calf with fat layer exposed ([address] - Primary           Procedure Note  Indications:  Based on my examination of this patient's wound(s)/ulcer(s) today, debridement is required to promote healing and evaluate the wound base. Performed by: Joy Fabian DPM    Consent obtained:  Yes    Time out taken:  Yes    Pain Control: Anesthetic  Anesthetic: 4% Lidocaine Liquid Topical(5 ml)       Debridement: Excisional Debridement    Using #15 blade scalpel and forceps the wound(s)/ulcer(s) was/were debrided down through and including the removal of epidermis, dermis and subcutaneous tissue. Devitalized Tissue Debrided:  fibrin and slough    Pre Debridement Measurements:  Are located in the [place]  Documentation Flow Sheet    Diabetic/Pressure/Non Pressure Ulcers only:  Ulcer: Non-Pressure ulcer, fat layer exposed     Wound/Ulcer #: 1 and 2    Post Debridement Measurements:  Wound/Ulcer Descriptions are Pre Debridement except measurements:    Wound 05/06/21 Leg Right; Anterior; Lower #1 Noted 5/1/21 (Active)   Wound Image   05/06/21 0911   Dressing Status Old drainage noted 05/06/21 0911   Wound Length (cm) 10.6 cm 05/06/21 0911   Wound Width (cm) 3.7 cm 05/06/21 0911   Wound Depth (cm) 0.1 cm 05/06/21 0911   Wound Surface Area (cm^2) 39.22 cm^2 05/06/21 0911   Wound Volume (cm^3) 3.92 cm^3 05/06/21 0911   Post-Procedure Length (cm) 10.7 cm 05/06/21 0929   Post-Procedure Width (cm) 3.9 cm 05/06/21 0929   Post-Procedure Depth (cm) 0.1 cm 05/06/21 0929   Post-Procedure Surface Area (cm^2) 41.73 cm^2 05/06/21 0929   Post-Procedure Volume (cm^3) 4.17 cm^3 05/06/21 1108 fitted for compression stockings to prevent reulceration as he does have evidence of chronic venous disease. Will control edema with multilayer compression dressing. Patient's nails x10 were debrided in length and thickness. The subungual ulceration/abscess was noted and patient was instructed to apply antibiotic ointment and a Band-Aid on the area daily. Patient was instructed to follow-up with podiatry for routine diabetic foot care. Treatment Note please see attached Discharge Instructions    Written patient dismissal instructions given to patient and signed by patient or POA. Reappoint 1 week for follow-up or sooner if problems develop       Discharge Instructions       [address] and [facility]   Physician Orders and Discharge Instructions  [facility]  [address], [address]  Telephone: [phone]    NAME:  Dalia Mike  YOB: 1945  MEDICAL RECORD NUMBER:  [id number]  DATE:  5/6/2021    Wound Cleansing:   Do not scrub or use excessive force. Cleanse wound prior to applying a clean dressing with:  [x] Normal Saline  [x] Keep Wound Dry in Shower    [] Wound Cleanser   [] Cleanse wound with Mild Soap & Water  [] May Shower at Discharge   [] Other:       Topical Treatments:  Do not apply lotions, creams, or ointments to wound bed unless directed. [] Apply moisturizing lotion to skin surrounding the wound prior to dressing change.  [] Apply antifungal ointment to skin surrounding the wound prior to dressing change.  [] Apply thin film of moisture barrier ointment to skin immediately around wound.   [] Other:       Dressings:           Wound Location : RIGHT LEG    [x] Apply Primary Dressing:       [x] Alginate with Silver    [] Other:    [x] Cover and Secure with:     [] Gauze [] [place] [] Roll gauze   [] Ace Wrap [] Cover Roll Tape [] ABD     [x] Other: DRAWTEX AND OPTILOCK   Avoid contact of tape with skin. [] Change dressing: [] Daily    [] Every Other Day [] Three times per week   [] Once a week [x] Do Not Change Dressing   [] Other:      Dressings : Wound location : RIGHT GREAT TOE             Apply Polysporin and bandaid to wound. Change dressing daily. Edema Control:  Apply: [] Compression Stocking []Right Leg []Left Leg   [] Tubigrip []Right Leg Double Layer []Left Leg Double Layer       []Right Leg Single Layer []Left Leg Single Layer   [x] SpandaGrip []Right Leg  [x]Left Leg      []Low compression 5-10 mm/Hg      [x]Medium compression 10-20 mm/Hg     []High compression  20-30 mm/Hg   every morning immediately when getting up should be applied to affected leg(s) from mid foot to knee making sure to cover the heel. Remove every night before going to bed. [x] Elevate leg(s) above the level of the heart when sitting. [x] Avoid prolonged standing in one place. Compression: COBAN 2  Apply: [x] Multilayer Compression Wrap Applied in Clinic [x]RightLeg []Left Leg   [x] Multi-layer compression. Do not get leg(s) with wrap wet. If wraps become too tight call the center or completely remove the wrap. [x] Elevate leg(s) above the level of the heart when sitting. [x] Avoid prolonged standing in one place.     Off-Loading:   [] Off-loading when [] walking  [] in bed [] sitting  [] Total non-weight bearing  [] Right Leg  [] Left Leg   [] Assistive Device [] Walker [] Cane  [] Wheelchair  [] Crutches   [] Surgical shoe    [] Podus Boot(s)   [] Foam Boot(s)  [] Roll About    [] Cast Boot [] CROW Boot  [] Other:     Dietary:  [] Diet as tolerated:   [x] Calorie Diabetic Diet:   [] No Added Salt:  [x] Increase Protein:   [] Other:     Activity:  [x] Activity as tolerated:  [] Patient has no activity restrictions     [] Strict Bedrest: [] Remain off Work:     [] May return to full duty work:                                   [] Return to work with restrictions: If you are still having pain after you go home:  [x] Elevate the affected limb. [x] Use over-the-counter medications you would normally use for pain as permitted by your family doctor. [x] For persistent pain not relieved by the above interventions, please call your family doctor. Return Appointment:  [] Wound and dressing supply provider:   [] ECF or Home Healthcare:  [] Wound Assessment: [] Physician or NP scheduled for Wound Assessment:   [x] Return Appointment: With DR SHEPHERD  in  1 Week(s)  [] Ordered tests:     Nurse Case Manger:  PAULETTE     Electronically signed by Diane Hooper RN on 5/6/2021 at 9:30 [address]: Should you experience any significant changes in your wound(s) or have questions about your wound care, please contact the [address] [place] [address] at [address] 8:00 am - 4:30 pm and Friday 8:00 am - 12:30 pm.  If you need help with your wound outside these hours and cannot wait until we are again available, contact your PCP or go to the hospital emergency room. PLEASE NOTE: IF YOU ARE UNABLE TO OBTAIN WOUND SUPPLIES, CONTINUE TO USE THE SUPPLIES YOU HAVE AVAILABLE UNTIL YOU ARE ABLE TO REACH US. IT IS MOST IMPORTANT TO KEEP THE WOUND COVERED AT ALL TIMES.      Physician Signature:_______________________    Date: ___________ Time:  ____________        Dr Ricky Haley                        Electronically signed by Ricky Haley DPM on 5/6/2021 at 10:37 AM

## 2021-05-13 ENCOUNTER — HOSPITAL ENCOUNTER (OUTPATIENT)
Dept: WOUND CARE | Age: 76
Discharge: HOME | End: 2021-05-13
Payer: MEDICARE

## 2021-05-13 VITALS
TEMPERATURE: 97.2 F | SYSTOLIC BLOOD PRESSURE: 121 MMHG | HEART RATE: 64 BPM | RESPIRATION RATE: 16 BRPM | DIASTOLIC BLOOD PRESSURE: 74 MMHG

## 2021-05-13 DIAGNOSIS — L97.212 NON-PRESSURE CHRONIC ULCER OF RIGHT CALF WITH FAT LAYER EXPOSED (HCC): Primary | ICD-10-CM

## 2021-05-13 PROCEDURE — 11042 DBRDMT SUBQ TIS 1ST 20SQCM/<: CPT

## 2021-05-13 RX ORDER — LIDOCAINE 50 MG/G
OINTMENT TOPICAL ONCE
Status: CANCELLED | OUTPATIENT
Start: 2021-05-13 | End: 2021-05-13

## 2021-05-13 RX ORDER — CLOBETASOL PROPIONATE 0.5 MG/G
OINTMENT TOPICAL ONCE
Status: CANCELLED | OUTPATIENT
Start: 2021-05-13 | End: 2021-05-13

## 2021-05-13 RX ORDER — LIDOCAINE HYDROCHLORIDE 20 MG/ML
JELLY TOPICAL ONCE
Status: CANCELLED | OUTPATIENT
Start: 2021-05-13 | End: 2021-05-13

## 2021-05-13 RX ORDER — LIDOCAINE 40 MG/G
CREAM TOPICAL ONCE
Status: CANCELLED | OUTPATIENT
Start: 2021-05-13 | End: 2021-05-13

## 2021-05-13 RX ORDER — LIDOCAINE HYDROCHLORIDE 40 MG/ML
SOLUTION TOPICAL ONCE
Status: COMPLETED | OUTPATIENT
Start: 2021-05-13 | End: 2021-05-13

## 2021-05-13 RX ORDER — BETAMETHASONE DIPROPIONATE 0.05 %
OINTMENT (GRAM) TOPICAL ONCE
Status: CANCELLED | OUTPATIENT
Start: 2021-05-13 | End: 2021-05-13

## 2021-05-13 RX ORDER — BACITRACIN ZINC AND POLYMYXIN B SULFATE 500; 1000 [USP'U]/G; [USP'U]/G
OINTMENT TOPICAL ONCE
Status: CANCELLED | OUTPATIENT
Start: 2021-05-13 | End: 2021-05-13

## 2021-05-13 RX ORDER — GENTAMICIN SULFATE 1 MG/G
OINTMENT TOPICAL ONCE
Status: CANCELLED | OUTPATIENT
Start: 2021-05-13 | End: 2021-05-13

## 2021-05-13 RX ORDER — LIDOCAINE HYDROCHLORIDE 40 MG/ML
SOLUTION TOPICAL ONCE
Status: CANCELLED | OUTPATIENT
Start: 2021-05-13 | End: 2021-05-13

## 2021-05-13 RX ORDER — GINSENG 100 MG
CAPSULE ORAL ONCE
Status: CANCELLED | OUTPATIENT
Start: 2021-05-13 | End: 2021-05-13

## 2021-05-13 RX ORDER — BACITRACIN, NEOMYCIN, POLYMYXIN B 400; 3.5; 5 [USP'U]/G; MG/G; [USP'U]/G
OINTMENT TOPICAL ONCE
Status: CANCELLED | OUTPATIENT
Start: 2021-05-13 | End: 2021-05-13

## 2021-05-13 RX ADMIN — LIDOCAINE HYDROCHLORIDE 5 ML: 40 SOLUTION TOPICAL at 09:19

## 2021-05-13 ASSESSMENT — PAIN SCALES - GENERAL
PAINLEVEL_OUTOF10: 0
PAINLEVEL_OUTOF10: 0

## 2021-05-13 NOTE — PLAN OF CARE
Multilayer Compression Wrap   (Not Unna) Below the Knee    NAME:  Jessica Bro  YOB: 1945  MEDICAL RECORD NUMBER:  [id number]  DATE:  5/13/2021    Multilayer compression wrap: Removed old Multilayer wrap if indicated and wash leg with mild soap/water. Applied moisturizing agent to dry skin as needed. Applied primary and secondary dressing as ordered. Applied multilayered dressing below the knee to right lower leg. Instructed patient/caregiver not to remove dressing and to keep it clean and dry. Instructed patient/caregiver on complications to report to provider, such as pain, numbness in toes, heavy drainage, and slippage of dressing. Instructed patient on purpose of compression dressing and on activity and exercise recommendations.       Electronically signed by Lilly De La Cruz RN on 5/13/2021 at 11:57 AM

## 2021-05-13 NOTE — PROGRESS NOTES
WISDOM TOOTH EXTRACTION         FAMILY HISTORY    Family History   Problem Relation Age of Onset    Diabetes Mother     Stroke Father     Diabetes Sister     Hypertension Sister     Diabetes Brother     Heart Disease Brother         MI    Cancer Brother         Skin       SOCIAL HISTORY    Social History     Tobacco Use    Smoking status: Never Smoker    Smokeless tobacco: Never Used   Substance Use Topics    Alcohol use: Yes     Comment: Occasional    Drug use: Never       ALLERGIES    Allergies   Allergen Reactions    Penicillins Anaphylaxis    Metformin Other (See Comments)     Flank pain       MEDICATIONS    Current Outpatient Medications on File Prior to Encounter   Medication Sig Dispense Refill    canagliflozin (INVOKANA) 300 MG TABS tablet Take 300 mg by mouth every morning (before breakfast)      pantoprazole (PROTONIX) 20 MG tablet TAKE 1 TABLET BY MOUTH EVERY DAY 90 tablet 0    citalopram (CELEXA) 40 MG tablet TAKE 1/2 TABLET BY MOUTH EVERY DAY 45 tablet 0    glimepiride (AMARYL) 4 MG tablet Take 1 tablet by mouth every morning (before breakfast) (Patient taking differently: Take 2 mg by mouth every morning (before breakfast) ) 90 tablet 0    empagliflozin (JARDIANCE) 25 MG tablet Take 1 tablet by mouth every day 90 tablet 0    levothyroxine (SYNTHROID) 50 MCG tablet TAKE 1 TABLET BY MOUTH EVERY DAY 90 tablet 0    rivaroxaban (XARELTO) 20 MG TABS tablet TAKE 1 TABLET BY MOUTH EVERY DAY 90 tablet 0    blood glucose test strips (ASCENSIA AUTODISC VI;ONE TOUCH ULTRA TEST VI) strip Use to test sugar once daily. Dx: E11.9 100 each 3    ONE TOUCH ULTRASOFT LANCETS MISC Use to test sugar once daily.   Dx: E11.9 100 each 3    ascorbic acid (VITAMIN C) 1000 MG tablet Take 1,000 mg by mouth daily      vitamin D (CHOLECALCIFEROL) 25 MCG (1000 UT) TABS tablet Take 25 mcg by mouth daily      simvastatin (ZOCOR) 40 MG tablet Take 40 mg by mouth nightly       sotalol (BETAPACE) 80 MG tablet Take 80 mg by mouth 2 times daily        No current facility-administered medications on file prior to encounter. REVIEW OF SYSTEMS  Review of Systems    Pertinent items are noted in HPI. Review of Systems: A 12 point review of symptoms is unremarkable with the exception of the chief complaint. Patient specifically denies nausea, fever, vomiting, chills, shortness of breath, chest pain, abdominal pain, constipation or difficulty urinating. Objective:      /74   Pulse 64   Temp 97.2 °F (36.2 °C) (Temporal)   Resp 16     Wt Readings from Last 3 Encounters:   05/06/21 291 lb 7.2 oz (132.2 kg)   05/04/21 292 lb (132.5 kg)   03/01/21 294 lb (133.4 kg)       PHYSICAL EXAM  Physical Exam    DP/PT pulses weakly palpable bilaterally. Pedal pulses are strongly biphasic on hand-held Doppler. CFT brisk to all digits. Digits are pink and warm to the touch. Hair growth is sprse in appearance. Pitting edema noted, bilaterally right>left, which has improved with compression dressing. No calf pain with palpation noted. Patient has brawny pigmentary changes noted on the bilateral lower extremities right greater than left. There are multiple varicosities noted on the bilateral lower extremities right greater than left. Epicritic sensation is grossly intact bilaterally. Negative clonus and babinski reflex is down going. An ulceration is noted on the anterior surface of the right shin. The area is no longer seeping and the edema is better controlled. Wound has fibrotic and nonviable tissue that extends down through and includes the subcutaneous tissue. After debridement the wound has a granular base. The wound does not probe or track to bone. Patient's nails x10 are extremely thick, yellow, crumbly, with subungual debris. The previously noted ulceration underneath the right hallux nail has epithelialized.   Patient's muscle strength is intact and symmetrical for all groups tested bilateral lower extremities. Assessment:        Problem List Items Addressed This Visit     Non-pressure chronic ulcer of right calf with fat layer exposed ([address] - Primary    Relevant Orders    Initiate Outpatient Wound Care Protocol           Procedure Note  Indications:  Based on my examination of this patient's wound(s)/ulcer(s) today, debridement is required to promote healing and evaluate the wound base. Performed by: Sonya Newman DPM    Consent obtained:  Yes    Time out taken:  Yes    Pain Control: Anesthetic  Anesthetic: 4% Lidocaine Liquid Topical(5 ml)       Debridement: Excisional Debridement    Using #15 blade scalpel and forceps the wound(s)/ulcer(s) was/were debrided down through and including the removal of epidermis, dermis and subcutaneous tissue. Devitalized Tissue Debrided:  fibrin and slough    Pre Debridement Measurements:  Are located in the [facility]  Documentation Flow Sheet    Diabetic/Pressure/Non Pressure Ulcers only:  Ulcer: Non-Pressure ulcer, fat layer exposed     Wound/Ulcer #: 1    Post Debridement Measurements:  Wound/Ulcer Descriptions are Pre Debridement except measurements:    Wound 05/06/21 Leg Right; Anterior; Lower #1 Noted 5/1/21 (Active)   Wound Image   05/06/21 0911   Wound Etiology Venous 05/13/21 0906   Dressing Status Intact; Old drainage noted 05/13/21 0906   Wound Cleansed Cleansed with saline 05/06/21 1010   Dressing/Treatment Collagen;Moisten with saline; Other (comment) 05/13/21 1009   Wound Length (cm) 7.4 cm 05/13/21 0906   Wound Width (cm) 1.8 cm 05/13/21 0906   Wound Depth (cm) 0.1 cm 05/13/21 0906   Wound Surface Area (cm^2) 13.32 cm^2 05/13/21 0906   Change in Wound Size % (l*w) 66.04 05/13/21 0906   Wound Volume (cm^3) 1.33 cm^3 05/13/21 0906   Wound Healing % 66 05/13/21 0906   Post-Procedure Length (cm) 7.6 cm 05/13/21 0958   Post-Procedure Width (cm) 2 cm 05/13/21 0958   Post-Procedure Depth (cm) 0.1 cm 05/13/21 0958   Post-Procedure Surface Area (cm^2) 15.2 cm^2 05/13/21 0958   Post-Procedure Volume (cm^3) 1.52 cm^3 05/13/21 0958   Wound Assessment Granulation tissue;Slough 05/13/21 0906   Drainage Amount Large 05/13/21 0906   Drainage Description Serosanguinous 05/13/21 0906   Odor None 05/13/21 0906   Kiara-wound Assessment Blanchable erythema 05/13/21 0906   Margins Attached edges 05/13/21 0906   Wound Thickness Description not for Pressure Injury Full thickness 05/13/21 0906   Number of days: 7       Wound 05/06/21 Toe (Comment  which one) Right #2 RIGHT GREAT TOE ( NOTED 5/6/2021) (Active)   Wound Image   05/13/21 0906   Wound Etiology Diabetic Galvan 2 05/06/21 0929   Dressing Status Clean;Dry; Intact 05/13/21 0906   Wound Cleansed Cleansed with saline 05/06/21 1010   Dressing/Treatment Antibacterial ointment 05/06/21 1010   Wound Length (cm) 0 cm 05/13/21 0906   Wound Width (cm) 0 cm 05/13/21 0906   Wound Depth (cm) 0 cm 05/13/21 0906   Wound Surface Area (cm^2) 0 cm^2 05/13/21 0906   Change in Wound Size % (l*w) 100 05/13/21 0906   Wound Volume (cm^3) 0 cm^3 05/13/21 0906   Wound Healing % 100 05/13/21 0906   Post-Procedure Length (cm) 0 cm 05/13/21 0958   Post-Procedure Width (cm) 0 cm 05/13/21 0958   Post-Procedure Depth (cm) 0 cm 05/13/21 0958   Post-Procedure Surface Area (cm^2) 0 cm^2 05/13/21 0958   Post-Procedure Volume (cm^3) 0 cm^3 05/13/21 0958   Wound Assessment Epithelialization 05/13/21 0906   Drainage Amount None 05/13/21 0906   Drainage Description Serosanguinous 05/06/21 0929   Odor None 05/13/21 0906   Kiara-wound Assessment Dry/flaky 05/13/21 0906   Margins Attached edges 05/06/21 0929   Wound Thickness Description not for Pressure Injury Full thickness 05/06/21 0929   Number of days: 7          Total Surface Area Debrided:  15.2 sq cm     Estimated Blood Loss:  Minimal    Hemostasis Achieved:  by pressure    Procedural Pain:  0  / 10     Post Procedural Pain:  0 / 10     Response to treatment:  Well tolerated by patient.        Plan:   E/M x30 minutes of a problem of a right venous leg ulceration. The wound was excisionally debrided and will a alginate dressing was applied to better manage edema. Prolonged discussion with patient that once the ulceration is healed he will need to be fitted for compression stockings to prevent reulceration as he does have evidence of chronic venous disease. Will control edema with multilayer compression dressing. Patient was instructed to follow-up with podiatry for routine diabetic foot care. Treatment Note please see attached Discharge Instructions    Written patient dismissal instructions given to patient and signed by patient or POA. Reappoint 1 week for follow-up or sooner if problems develop       Discharge Instructions       [address] and [facility] Therapy   Physician Orders and Discharge Instructions  [facility]  [address]   [address] [facility], [facility] [address]  Telephone: [phone]     NAME:  Cesar Yoo  YOB: 1945  MEDICAL RECORD NUMBER:  [id number]  DATE:  5/13/2021     Wound Cleansing:   Do not scrub or use excessive force. Cleanse wound prior to applying a clean dressing with:  [x]? Normal Saline  [x]? Keep Wound Dry in Shower    []? Wound Cleanser   []? Cleanse wound with Mild Soap & Water  []? May Shower at Discharge   []? Other:        Topical Treatments:  Do not apply lotions, creams, or ointments to wound bed unless directed. []? Apply moisturizing lotion to skin surrounding the wound prior to dressing change. []? Apply antifungal ointment to skin surrounding the wound prior to dressing change. []? Apply thin film of moisture barrier ointment to skin immediately around wound. []? Other:                  Dressings:                  Wound Location : RIGHT LEG         [x]? Apply Primary Dressing:                                          [x]?  PLAIN COLLAGEN MOISTENED WITH SALINE []? Other:    [x]? Cover and Secure with:                   []? Gauze         []? Laurie Dapper           []? Roll gauze              []? Ace Wrap   []? Cover Roll Tape     []? ABD                                      [x]? Other: DRAWTEX AND OPTILOCK              Avoid contact of tape with skin. []? Change dressing:   []? Daily           []? Every Other Day    []? Three times per week              []? Once a week          [x]? Do Not Change Dressing              []? Other:                   Edema Control:  Apply:  []? Compression Stocking       []? Right Leg     []? Left Leg              []? Tubigrip      []? Right Leg Double Layer      []? Left Leg Double Layer                                                  []?Right Leg Single Layer       []? Left Leg Single Layer              [x]? SpandaGrip            []? Right Leg     [x]? Left Leg                                      []?Low compression 5-10 mm/Hg                                             [x]? Medium compression 10-20 mm/Hg                                      []?High compression  20-30 mm/Hg              every morning immediately when getting up should be applied to affected leg(s) from mid foot to knee making sure to cover the heel. Remove every night before going to bed. [x]? Elevate leg(s) above the level of the heart when sitting. [x]? Avoid prolonged standing in one place.                   Compression: COBAN 2  Apply:  [x]? Multilayer Compression Wrap Applied in Clinic    [x]? RightLeg      []? Left Leg              [x]? Multi-layer compression. Do not get leg(s) with wrap wet. If wraps become too tight call the center or completely remove the wrap. [x]? Elevate leg(s) above the level of the heart when sitting. [x]? Avoid prolonged standing in one place.     Off-Loading:   []? Off-loading when    []? walking       []? in bed         []? sitting  []? Total non-weight bearing  []? Right Leg  []? Left Leg          []? Assistive Device     []? Arlean Schilder        []? Cane           []? Wheelchair  []? Crutches              []? Surgical shoe    []? Podus Boot(s)   []? Foam Boot(s)  []? Roll About              []? Cast Boot   []? CROW Boot  []? Other:                Dietary:  []? Diet as tolerated:     [x]? Calorie Diabetic Diet:           []? No Added Salt:  [x]? Increase Protein:     []? Other:                Activity:  [x]? Activity as tolerated:  []? Patient has no activity restrictions     []? Strict Bedrest: []? Remain off Work:     []? May return to full duty work:                                    []? Return to work with restrictions:                 If you are still having pain after you go home:  [x]? Elevate the affected limb. [x]? Use over-the-counter medications you would normally use for pain as permitted by your family doctor. [x]? For persistent pain not relieved by the above interventions, please call your family doctor.   [employer]   Return Appointment:  []? Wound and dressing supply provider:   []? ECF or Home Healthcare:  []? Wound Assessment:          []? Physician or NP scheduled for Wound Assessment:   [x]? Return Appointment: With DR SHEPHERD  in  1 Week(s)  []? Ordered tests:      Nurse Case Manger:  PAULETTE     Electronically signed by Lexie Mancia RN on 5/13/2021 at 10:02 AM    [address] [place] [address] Information: Should you experience any significant changes in your wound(s) or have questions about your wound care, please contact the [address] [place] [address] at [address] 8:00 am - 4:30 pm and Friday 8:00 am - 12:30 pm.  If you need help with your wound outside these hours and cannot wait until we are again available, contact your PCP or go to the hospital emergency room.      PLEASE NOTE: IF YOU ARE UNABLE TO OBTAIN WOUND SUPPLIES, CONTINUE TO USE THE SUPPLIES YOU HAVE AVAILABLE UNTIL YOU ARE ABLE TO [address] [facility] [address].  IT IS MOST IMPORTANT TO KEEP THE WOUND COVERED AT ALL TIMES.      Physician Signature:_______________________     Date: ___________ Time:  ____________                                Dr Ricky Haley         Electronically signed by Ricky Haley DPM on 5/13/2021 at 12:41 PM

## 2021-05-20 ENCOUNTER — HOSPITAL ENCOUNTER (OUTPATIENT)
Dept: WOUND CARE | Age: 76
Discharge: HOME | End: 2021-05-20
Payer: MEDICARE

## 2021-05-20 VITALS
HEART RATE: 61 BPM | DIASTOLIC BLOOD PRESSURE: 60 MMHG | RESPIRATION RATE: 16 BRPM | TEMPERATURE: 97.2 F | SYSTOLIC BLOOD PRESSURE: 119 MMHG

## 2021-05-20 DIAGNOSIS — L97.212 NON-PRESSURE CHRONIC ULCER OF RIGHT CALF WITH FAT LAYER EXPOSED (HCC): Primary | ICD-10-CM

## 2021-05-20 PROCEDURE — 99211 OFF/OP EST MAY X REQ PHY/QHP: CPT

## 2021-05-20 RX ORDER — GENTAMICIN SULFATE 1 MG/G
OINTMENT TOPICAL ONCE
Status: CANCELLED | OUTPATIENT
Start: 2021-05-20 | End: 2021-05-20

## 2021-05-20 RX ORDER — LIDOCAINE 50 MG/G
OINTMENT TOPICAL ONCE
Status: CANCELLED | OUTPATIENT
Start: 2021-05-20 | End: 2021-05-20

## 2021-05-20 RX ORDER — LIDOCAINE HYDROCHLORIDE 20 MG/ML
JELLY TOPICAL ONCE
Status: CANCELLED | OUTPATIENT
Start: 2021-05-20 | End: 2021-05-20

## 2021-05-20 RX ORDER — CLOBETASOL PROPIONATE 0.5 MG/G
OINTMENT TOPICAL ONCE
Status: CANCELLED | OUTPATIENT
Start: 2021-05-20 | End: 2021-05-20

## 2021-05-20 RX ORDER — BACITRACIN ZINC AND POLYMYXIN B SULFATE 500; 1000 [USP'U]/G; [USP'U]/G
OINTMENT TOPICAL ONCE
Status: CANCELLED | OUTPATIENT
Start: 2021-05-20 | End: 2021-05-20

## 2021-05-20 RX ORDER — LIDOCAINE 40 MG/G
CREAM TOPICAL ONCE
Status: CANCELLED | OUTPATIENT
Start: 2021-05-20 | End: 2021-05-20

## 2021-05-20 RX ORDER — LIDOCAINE HYDROCHLORIDE 40 MG/ML
SOLUTION TOPICAL ONCE
Status: CANCELLED | OUTPATIENT
Start: 2021-05-20 | End: 2021-05-20

## 2021-05-20 RX ORDER — BETAMETHASONE DIPROPIONATE 0.05 %
OINTMENT (GRAM) TOPICAL ONCE
Status: CANCELLED | OUTPATIENT
Start: 2021-05-20 | End: 2021-05-20

## 2021-05-20 RX ORDER — BACITRACIN, NEOMYCIN, POLYMYXIN B 400; 3.5; 5 [USP'U]/G; MG/G; [USP'U]/G
OINTMENT TOPICAL ONCE
Status: CANCELLED | OUTPATIENT
Start: 2021-05-20 | End: 2021-05-20

## 2021-05-20 RX ORDER — GINSENG 100 MG
CAPSULE ORAL ONCE
Status: CANCELLED | OUTPATIENT
Start: 2021-05-20 | End: 2021-05-20

## 2021-05-20 ASSESSMENT — PAIN SCALES - GENERAL: PAINLEVEL_OUTOF10: 0

## 2021-05-20 NOTE — PROGRESS NOTES
[address]   Progress Note and Procedure Note      [facility] RECORD NUMBER:  [id number]  AGE: 70 y.o. GENDER: male  : 1945  EPISODE DATE:  2021    Subjective:     Chief Complaint   Patient presents with    Wound Check     follow up right leg         HISTORY of PRESENT ILLNESS GARRICK Leonardo is a 70 y.o. male who presents today for wound/ulcer evaluation. History of Wound Context: Patient presents today for follow up of a right venous stasis ulceration. Patient relates he has tolerated his multilayer compression dressing. He presents today with his wife.   Wound/Ulcer Pain Timing/Severity: none  Quality of pain: N/A  Severity:  0 / 10   Modifying Factors: None  Associated Signs/Symptoms: edema and drainage    Ulcer Identification:  Ulcer Type: venous    Contributing Factors: edema, venous stasis and diabetes    Acute Wound: N/A    PAST MEDICAL HISTORY        Diagnosis Date    Anxiety     Chronic atrial fibrillation (HCC)     S/P Pacer / On Xarelto and Betapace / Dr. Hannah Otto GERD without esophagitis     Hypercholesterolemia     Hypothyroidism (acquired)     Major depression single episode, in partial remission ([facility] [address]     Non morbid obesity due to excess calories     SSS (sick sinus syndrome) ([facility])     S/P pacemaker / Dr. Raul Fallon     Type 2 diabetes mellitus without complication, without long-term current use of insulin ([facility])        PAST SURGICAL HISTORY    Past Surgical History:   Procedure Laterality Date    CARDIAC SURGERY      ABLATION    COLONOSCOPY      PACEMAKER PLACEMENT      TONSILLECTOMY AND ADENOIDECTOMY      TOTAL KNEE ARTHROPLASTY Right     UMBILICAL HERNIA REPAIR      WISDOM TOOTH EXTRACTION         FAMILY HISTORY    Family History   Problem Relation Age of Onset    Diabetes Mother     Stroke Father     Diabetes Sister     Hypertension Sister     Diabetes Brother     Heart Disease Brother         MI    Cancer Brother         Skin       SOCIAL HISTORY    Social History     Tobacco Use    Smoking status: Never Smoker    Smokeless tobacco: Never Used   Vaping Use    Vaping Use: Never used   Substance Use Topics    Alcohol use: Yes     Comment: Occasional    Drug use: Never       ALLERGIES    Allergies   Allergen Reactions    Penicillins Anaphylaxis    Metformin Other (See Comments)     Flank pain       MEDICATIONS    Current Outpatient Medications on File Prior to Encounter   Medication Sig Dispense Refill    canagliflozin (INVOKANA) 300 MG TABS tablet Take 300 mg by mouth every morning (before breakfast)      pantoprazole (PROTONIX) 20 MG tablet TAKE 1 TABLET BY MOUTH EVERY DAY 90 tablet 0    citalopram (CELEXA) 40 MG tablet TAKE 1/2 TABLET BY MOUTH EVERY DAY 45 tablet 0    glimepiride (AMARYL) 4 MG tablet Take 1 tablet by mouth every morning (before breakfast) (Patient taking differently: Take 2 mg by mouth every morning (before breakfast) ) 90 tablet 0    empagliflozin (JARDIANCE) 25 MG tablet Take 1 tablet by mouth every day 90 tablet 0    levothyroxine (SYNTHROID) 50 MCG tablet TAKE 1 TABLET BY MOUTH EVERY DAY 90 tablet 0    rivaroxaban (XARELTO) 20 MG TABS tablet TAKE 1 TABLET BY MOUTH EVERY DAY 90 tablet 0    blood glucose test strips (ASCENSIA AUTODISC VI;ONE TOUCH ULTRA TEST VI) strip Use to test sugar once daily. Dx: E11.9 100 each 3    ONE TOUCH ULTRASOFT LANCETS MISC Use to test sugar once daily. Dx: E11.9 100 each 3    ascorbic acid (VITAMIN C) 1000 MG tablet Take 1,000 mg by mouth daily      vitamin D (CHOLECALCIFEROL) 25 MCG (1000 UT) TABS tablet Take 25 mcg by mouth daily      simvastatin (ZOCOR) 40 MG tablet Take 40 mg by mouth nightly       sotalol (BETAPACE) 80 MG tablet Take 80 mg by mouth 2 times daily        No current facility-administered medications on file prior to encounter. REVIEW OF SYSTEMS  Review of Systems    Pertinent items are noted in HPI.    Review of Systems: A 12 point review of symptoms is unremarkable with the exception of the chief complaint. Patient specifically denies nausea, fever, vomiting, chills, shortness of breath, chest pain, abdominal pain, constipation or difficulty urinating. Objective:      /60   Pulse 61   Temp 97.2 °F (36.2 °C) (Temporal)   Resp 16     Wt Readings from Last 3 Encounters:   05/06/21 291 lb 7.2 oz (132.2 kg)   05/04/21 292 lb (132.5 kg)   03/01/21 294 lb (133.4 kg)       PHYSICAL EXAM  Physical Exam    DP/PT pulses weakly palpable bilaterally. Pedal pulses are strongly biphasic on hand-held Doppler. CFT brisk to all digits. Digits are pink and warm to the touch. Hair growth is sprse in appearance. Pitting edema noted, bilaterally right>left, which has improved with compression dressing. No calf pain with palpation noted. Patient has brawny pigmentary changes noted on the bilateral lower extremities right greater than left. There are multiple varicosities noted on the bilateral lower extremities right greater than left. Epicritic sensation is grossly intact bilaterally. Negative clonus and babinski reflex is down going. Previously noted right lower extremity ulceration has completely epithelialized. Patient's edema is well controlled  Patient's nails x10 are extremely thick, yellow, crumbly, with subungual debris. The previously noted ulceration underneath the right hallux nail has epithelialized. Patient's muscle strength is intact and symmetrical for all groups tested bilateral lower extremities. 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 Rx compression stockings 3 to 40 mmHg knee-high.     Will control edema with spanned  until his compression stockings are

## 2021-07-14 RX ORDER — EMPAGLIFLOZIN 25 MG/1
TABLET, FILM COATED ORAL
Qty: 90 TABLET | Refills: 0 | OUTPATIENT
Start: 2021-07-14

## 2021-07-15 NOTE — TELEPHONE ENCOUNTER
Patient has first appointment scheduled on 7/21 can he now get a refill of his medication he is out of piills for his jardiance . Please call him back and let him know. [facility] .   Please call him back at [phone].

## 2021-07-19 RX ORDER — EMPAGLIFLOZIN 25 MG/1
TABLET, FILM COATED ORAL
Qty: 90 TABLET | Refills: 0 | Status: SHIPPED | OUTPATIENT
Start: 2021-07-19 | End: 2021-07-21 | Stop reason: SDUPTHER

## 2021-07-19 NOTE — PROGRESS NOTES
Subjective:      Patient ID: Jay Richards is a 70 y.o. male. Diabetes  He presents for his follow-up diabetic visit. He has type 2 diabetes mellitus. His disease course has been stable. Hypoglycemia symptoms include nervousness/anxiousness. Pertinent negatives for diabetes include no chest pain, no foot paresthesias, no foot ulcerations, no polydipsia and no polyuria. There are no hypoglycemic complications. Symptoms are stable. There are no diabetic complications. Risk factors for coronary artery disease include diabetes mellitus, dyslipidemia, family history, male sex and obesity. Current diabetic treatment includes oral agent (dual therapy). He is compliant with treatment all of the time. His weight is stable. He is following a generally healthy diet. He participates in exercise three times a week. Home blood sugar record trend: Patient has not been testing his sugars. An ACE inhibitor/angiotensin II receptor blocker is not being taken. Eye exam is current (March 2021). Hyperlipidemia:  Patient is tolerating and compliant with Simvastatin 40 mg daily. His last lipid check was 10-8-20 and was at goal for LDL. Hypothyroidism:  Patient is tolerating and compliant with Synthroid 50 mcg daily. His TSH was normal on 10-8-20. Depression / Anxiety: Patient is tolerating and compliant with Celexa 40 mg 1/2 daily. He feels that the medication is helping with his symptoms. He is sleeping well and denies any suicidal ideation. He feels that he still needs to be on the medication. GERD:  Patient is tolerating and compliant with Protonix 20 mg daily. He feels that the medication completely controls his symptoms. Chronic Atrial Fibrillation:  Patient sees Dr. Lennox Owusu in [place] and is S/P pacemaker insertion. He takes Xarelto 20 mg daily and Betapace 80 mg BID. Obesity:  Patient weighed 292 at his visit on 5-4-21. He has lost 4 lbs since then.         Review of Systems   Cardiovascular: Negative for chest pain. Endocrine: Negative for polydipsia and polyuria. Psychiatric/Behavioral: Positive for dysphoric mood. Negative for suicidal ideas. The patient is nervous/anxious. /76   Pulse 61   Resp 16   Ht 6' 2\" (1.88 m)   Wt 288 lb (130.6 kg)   SpO2 98%   BMI 36.98 kg/m²    Objective:   Physical Exam  Vitals reviewed. Constitutional:       General: He is not in acute distress. Appearance: He is well-developed. HENT:      Head: Normocephalic. Right Ear: External ear normal.      Left Ear: External ear normal.   Neck:      Thyroid: No thyromegaly. Vascular: No carotid bruit or JVD. Cardiovascular:      Rate and Rhythm: Normal rate and regular rhythm. Heart sounds: Normal heart sounds. No murmur heard. Pulmonary:      Effort: Pulmonary effort is normal.      Breath sounds: Normal breath sounds. No wheezing or rales. Abdominal:      Comments: Obese. Lymphadenopathy:      Cervical: No cervical adenopathy. Neurological:      Mental Status: He is alert and oriented to person, place, and time. Assessment:      Type 2 Diabetes  Hyperlipidemia  Hypothyroidism   Depression  Anxiety  GERD  Chronic Atrial Fibrillation   Obesity       Plan:      HgbA1C was 7.3 today (Up from 7.0 last time). Rx Rybelsus 3 mg daily for 1 month then 7 mg daily    Reminded patient to have an eye exam  Refilled medication  Reminded patient to have a Shingrix and Tdap shot at the pharmacy.     Reminded patient to have a Colonoscopy   Patient had an AWV done at home thru [address] [facility] 4 months for Diabetes         Landen Montenegro DO

## 2021-07-21 ENCOUNTER — OFFICE VISIT (OUTPATIENT)
Dept: FAMILY MEDICINE CLINIC | Age: 76
End: 2021-07-21
Payer: MEDICARE

## 2021-07-21 VITALS
SYSTOLIC BLOOD PRESSURE: 136 MMHG | BODY MASS INDEX: 36.96 KG/M2 | RESPIRATION RATE: 16 BRPM | OXYGEN SATURATION: 98 % | WEIGHT: 288 LBS | HEART RATE: 61 BPM | DIASTOLIC BLOOD PRESSURE: 76 MMHG | HEIGHT: 74 IN

## 2021-07-21 DIAGNOSIS — E11.9 TYPE 2 DIABETES MELLITUS WITHOUT COMPLICATION, WITHOUT LONG-TERM CURRENT USE OF INSULIN (HCC): Primary | ICD-10-CM

## 2021-07-21 DIAGNOSIS — E78.00 HYPERCHOLESTEROLEMIA: ICD-10-CM

## 2021-07-21 DIAGNOSIS — E66.09 NON MORBID OBESITY DUE TO EXCESS CALORIES: ICD-10-CM

## 2021-07-21 DIAGNOSIS — I48.20 CHRONIC ATRIAL FIBRILLATION (HCC): ICD-10-CM

## 2021-07-21 DIAGNOSIS — E03.9 HYPOTHYROIDISM (ACQUIRED): ICD-10-CM

## 2021-07-21 DIAGNOSIS — F32.4 MAJOR DEPRESSION SINGLE EPISODE, IN PARTIAL REMISSION (HCC): ICD-10-CM

## 2021-07-21 DIAGNOSIS — K21.9 GERD WITHOUT ESOPHAGITIS: ICD-10-CM

## 2021-07-21 DIAGNOSIS — F41.9 ANXIETY: ICD-10-CM

## 2021-07-21 LAB — HBA1C MFR BLD: 7.3 %

## 2021-07-21 PROCEDURE — 83036 HEMOGLOBIN GLYCOSYLATED A1C: CPT | Performed by: FAMILY MEDICINE

## 2021-07-21 PROCEDURE — 99214 OFFICE O/P EST MOD 30 MIN: CPT | Performed by: FAMILY MEDICINE

## 2021-07-21 PROCEDURE — 3051F HG A1C>EQUAL 7.0%<8.0%: CPT | Performed by: FAMILY MEDICINE

## 2021-07-21 RX ORDER — CITALOPRAM 40 MG/1
TABLET ORAL
Qty: 45 TABLET | Refills: 1 | Status: SHIPPED | OUTPATIENT
Start: 2021-07-21 | End: 2022-03-30

## 2021-07-21 RX ORDER — SIMVASTATIN 40 MG
40 TABLET ORAL NIGHTLY
Qty: 30 TABLET | Refills: 3 | Status: SHIPPED | OUTPATIENT
Start: 2021-07-21 | End: 2021-09-09 | Stop reason: SDUPTHER

## 2021-07-21 RX ORDER — GLIMEPIRIDE 4 MG/1
4 TABLET ORAL
Qty: 90 TABLET | Refills: 1 | Status: SHIPPED | OUTPATIENT
Start: 2021-07-21 | End: 2021-10-27

## 2021-07-21 RX ORDER — SOTALOL HYDROCHLORIDE 80 MG/1
80 TABLET ORAL 2 TIMES DAILY
Qty: 60 TABLET | Refills: 3 | Status: SHIPPED | OUTPATIENT
Start: 2021-07-21 | End: 2021-07-30

## 2021-07-21 RX ORDER — EMPAGLIFLOZIN 25 MG/1
TABLET, FILM COATED ORAL
Qty: 90 TABLET | Refills: 1 | Status: SHIPPED | OUTPATIENT
Start: 2021-07-21 | End: 2022-06-05

## 2021-07-21 RX ORDER — ORAL SEMAGLUTIDE 3 MG/1
3 TABLET ORAL DAILY
Qty: 30 TABLET | Refills: 0 | Status: SHIPPED | OUTPATIENT
Start: 2021-07-21 | End: 2021-08-30

## 2021-07-21 RX ORDER — LEVOTHYROXINE SODIUM 0.05 MG/1
TABLET ORAL
Qty: 90 TABLET | Refills: 1 | Status: SHIPPED | OUTPATIENT
Start: 2021-07-21 | End: 2022-03-30

## 2021-07-21 RX ORDER — PANTOPRAZOLE SODIUM 20 MG/1
TABLET, DELAYED RELEASE ORAL
Qty: 90 TABLET | Refills: 1 | Status: SHIPPED | OUTPATIENT
Start: 2021-07-21 | End: 2021-09-09 | Stop reason: SDUPTHER

## 2021-07-21 RX ORDER — GLIMEPIRIDE 4 MG/1
4 TABLET ORAL
Qty: 90 TABLET | Refills: 0 | OUTPATIENT
Start: 2021-07-21

## 2021-07-21 SDOH — ECONOMIC STABILITY: FOOD INSECURITY: WITHIN THE PAST 12 MONTHS, YOU WORRIED THAT YOUR FOOD WOULD RUN OUT BEFORE YOU GOT MONEY TO BUY MORE.: NEVER TRUE

## 2021-07-21 SDOH — ECONOMIC STABILITY: FOOD INSECURITY: WITHIN THE PAST 12 MONTHS, THE FOOD YOU BOUGHT JUST DIDN'T LAST AND YOU DIDN'T HAVE MONEY TO GET MORE.: NEVER TRUE

## 2021-07-21 ASSESSMENT — SOCIAL DETERMINANTS OF HEALTH (SDOH): HOW HARD IS IT FOR YOU TO PAY FOR THE VERY BASICS LIKE FOOD, HOUSING, MEDICAL CARE, AND HEATING?: NOT HARD AT ALL

## 2021-07-30 RX ORDER — SOTALOL HYDROCHLORIDE 80 MG/1
TABLET ORAL
Qty: 180 TABLET | Refills: 1 | Status: SHIPPED | OUTPATIENT
Start: 2021-07-30 | End: 2021-11-22

## 2021-08-30 ENCOUNTER — TELEPHONE (OUTPATIENT)
Dept: FAMILY MEDICINE CLINIC | Age: 76
End: 2021-08-30

## 2021-08-30 RX ORDER — ORAL SEMAGLUTIDE 7 MG/1
7 TABLET ORAL DAILY
Qty: 90 TABLET | Refills: 0 | Status: SHIPPED | OUTPATIENT
Start: 2021-08-30 | End: 2021-11-22 | Stop reason: SDUPTHER

## 2021-08-30 NOTE — TELEPHONE ENCOUNTER
Pt called to ask Dr Jonn Kerr for a refill on the Semaglutide (Rybelsus). He states the next rx should be forSemaglutide (Rybelsus) 5 mg sent to [address] [place] [address] [phone] . He finished the rx for the Semaglutide(Rybelsus) 3 mg.  If any questions give pt a call [phone]

## 2021-09-09 RX ORDER — PANTOPRAZOLE SODIUM 20 MG/1
TABLET, DELAYED RELEASE ORAL
Qty: 90 TABLET | Refills: 0 | Status: SHIPPED | OUTPATIENT
Start: 2021-09-09 | End: 2022-06-28 | Stop reason: SDUPTHER

## 2021-09-09 RX ORDER — SIMVASTATIN 40 MG
40 TABLET ORAL NIGHTLY
Qty: 30 TABLET | Refills: 0 | Status: SHIPPED | OUTPATIENT
Start: 2021-09-09 | End: 2021-11-22 | Stop reason: SDUPTHER

## 2021-11-18 NOTE — PROGRESS NOTES
Subjective:      Patient ID: Rosa Lake is a 78 y.o. male. Diabetes  He presents for his follow-up diabetic visit. He has type 2 diabetes mellitus. His disease course has been stable. Hypoglycemia symptoms include nervousness/anxiousness. Pertinent negatives for diabetes include no chest pain, no foot paresthesias, no foot ulcerations, no polydipsia and no polyuria. There are no hypoglycemic complications. Symptoms are stable. There are no diabetic complications. Risk factors for coronary artery disease include diabetes mellitus, dyslipidemia, family history, male sex and obesity. Current diabetic treatment includes oral agent (triple therapy). He is compliant with treatment all of the time. His weight is stable. He is following a generally healthy diet. He participates in exercise three times a week. Home blood sugar record trend: Patient has not been testing his sugars. An ACE inhibitor/angiotensin II receptor blocker is not being taken. Eye exam is current (March 2021). Hyperlipidemia:  Patient is tolerating and compliant with Simvastatin 40 mg daily. He is fasting today for a lipid recheck. Hypothyroidism:  Patient is tolerating and compliant with Synthroid 50 mcg daily. He is due for a TSH recheck today. Depression / Anxiety: Patient is tolerating and compliant with Celexa 40 mg 1/2 daily. He feels that the medication is helping with his symptoms. He is sleeping well and denies any suicidal ideation. He feels that he still needs to be on the medication. GERD:  Patient is tolerating and compliant with Protonix 20 mg daily. He feels that the medication completely controls his symptoms. Chronic Atrial Fibrillation:  Patient sees Dr. Liudmila De in [place] and is S/P pacemaker insertion. He takes Xarelto 20 mg daily and Betapace 120 mg BID. He may be needing an ablation in the near future. Obesity:  Patient weighed 288 at his visit on 7-21-21. He has lost 8 lbs since then. Review of Systems   Cardiovascular: Negative for chest pain. Endocrine: Negative for polydipsia and polyuria. Psychiatric/Behavioral: Positive for dysphoric mood. Negative for suicidal ideas. The patient is nervous/anxious. /78   Pulse 72   Resp 18   Ht 6' 1\" (1.854 m)   Wt 280 lb (127 kg)   SpO2 99%   BMI 36.94 kg/m²    Objective:   Physical Exam  Vitals reviewed. Constitutional:       General: He is not in acute distress. Appearance: He is well-developed. HENT:      Head: Normocephalic. Right Ear: External ear normal.      Left Ear: External ear normal.   Neck:      Thyroid: No thyromegaly. Vascular: No carotid bruit or JVD. Cardiovascular:      Rate and Rhythm: Normal rate. Rhythm irregular. Heart sounds: Normal heart sounds. No murmur heard. Pulmonary:      Effort: Pulmonary effort is normal.      Breath sounds: Normal breath sounds. No wheezing or rales. Abdominal:      Comments: Obese. Lymphadenopathy:      Cervical: No cervical adenopathy. Neurological:      Mental Status: He is alert and oriented to person, place, and time. Comments: Bilateral foot exam shows no lesions. Bilateral monofilament exam is normal.           Assessment:      Type 2 Diabetes  Hyperlipidemia  Hypothyroidism   Depression  Anxiety  GERD  Chronic Atrial Fibrillation   Obesity       Plan:      HgbA1C, Chem 7, Lipid Panel, AST, ALT, TSH  Foot Exam Done  Refilled medication  Reminded patient to have a Shingrix and Tdap shot at the pharmacy.     Cologuard Test Ordered   Patient had an AWV done at home thru [address] [facility] 4 months for Diabetes         VIPIN LANDIN DO

## 2021-11-22 ENCOUNTER — OFFICE VISIT (OUTPATIENT)
Dept: FAMILY MEDICINE CLINIC | Age: 76
End: 2021-11-22
Payer: MEDICARE

## 2021-11-22 VITALS
HEART RATE: 72 BPM | RESPIRATION RATE: 18 BRPM | HEIGHT: 73 IN | BODY MASS INDEX: 37.11 KG/M2 | DIASTOLIC BLOOD PRESSURE: 78 MMHG | SYSTOLIC BLOOD PRESSURE: 126 MMHG | OXYGEN SATURATION: 99 % | WEIGHT: 280 LBS

## 2021-11-22 DIAGNOSIS — F32.4 MAJOR DEPRESSION SINGLE EPISODE, IN PARTIAL REMISSION (HCC): ICD-10-CM

## 2021-11-22 DIAGNOSIS — Z12.11 SCREEN FOR COLON CANCER: ICD-10-CM

## 2021-11-22 DIAGNOSIS — E78.00 HYPERCHOLESTEROLEMIA: ICD-10-CM

## 2021-11-22 DIAGNOSIS — I48.20 CHRONIC ATRIAL FIBRILLATION (HCC): ICD-10-CM

## 2021-11-22 DIAGNOSIS — K21.9 GERD WITHOUT ESOPHAGITIS: ICD-10-CM

## 2021-11-22 DIAGNOSIS — E03.9 HYPOTHYROIDISM (ACQUIRED): ICD-10-CM

## 2021-11-22 DIAGNOSIS — E11.9 TYPE 2 DIABETES MELLITUS WITHOUT COMPLICATION, WITHOUT LONG-TERM CURRENT USE OF INSULIN (HCC): Primary | ICD-10-CM

## 2021-11-22 DIAGNOSIS — F41.9 ANXIETY: ICD-10-CM

## 2021-11-22 DIAGNOSIS — Z23 NEED FOR INFLUENZA VACCINATION: ICD-10-CM

## 2021-11-22 DIAGNOSIS — E66.09 NON MORBID OBESITY DUE TO EXCESS CALORIES: ICD-10-CM

## 2021-11-22 LAB
ALT SERPL-CCNC: 23 U/L (ref 10–40)
ANION GAP SERPL CALCULATED.3IONS-SCNC: 10 MMOL/L (ref 3–16)
AST SERPL-CCNC: 23 U/L (ref 15–37)
BUN BLDV-MCNC: 15 MG/DL (ref 7–20)
CALCIUM SERPL-MCNC: 10.7 MG/DL (ref 8.3–10.6)
CHLORIDE BLD-SCNC: 102 MMOL/L (ref 99–110)
CHOLESTEROL, TOTAL: 124 MG/DL (ref 0–199)
CO2: 29 MMOL/L (ref 21–32)
CREAT SERPL-MCNC: 0.9 MG/DL (ref 0.8–1.3)
GFR AFRICAN AMERICAN: >60
GFR NON-AFRICAN AMERICAN: >60
GLUCOSE BLD-MCNC: 114 MG/DL (ref 70–99)
HDLC SERPL-MCNC: 33 MG/DL (ref 40–60)
LDL CHOLESTEROL CALCULATED: 67 MG/DL
POTASSIUM SERPL-SCNC: 5.2 MMOL/L (ref 3.5–5.1)
SODIUM BLD-SCNC: 141 MMOL/L (ref 136–145)
TRIGL SERPL-MCNC: 119 MG/DL (ref 0–150)
TSH SERPL DL<=0.05 MIU/L-ACNC: 1.15 UIU/ML (ref 0.27–4.2)
VLDLC SERPL CALC-MCNC: 24 MG/DL

## 2021-11-22 PROCEDURE — 90694 VACC AIIV4 NO PRSRV 0.5ML IM: CPT | Performed by: FAMILY MEDICINE

## 2021-11-22 PROCEDURE — 99214 OFFICE O/P EST MOD 30 MIN: CPT | Performed by: FAMILY MEDICINE

## 2021-11-22 PROCEDURE — G0008 ADMIN INFLUENZA VIRUS VAC: HCPCS | Performed by: FAMILY MEDICINE

## 2021-11-22 PROCEDURE — 3051F HG A1C>EQUAL 7.0%<8.0%: CPT | Performed by: FAMILY MEDICINE

## 2021-11-22 PROCEDURE — 36415 COLL VENOUS BLD VENIPUNCTURE: CPT | Performed by: FAMILY MEDICINE

## 2021-11-22 RX ORDER — ORAL SEMAGLUTIDE 7 MG/1
7 TABLET ORAL DAILY
Qty: 90 TABLET | Refills: 1 | Status: SHIPPED | OUTPATIENT
Start: 2021-11-22 | End: 2022-04-18

## 2021-11-22 RX ORDER — SIMVASTATIN 40 MG
40 TABLET ORAL NIGHTLY
Qty: 30 TABLET | Refills: 3 | Status: SHIPPED | OUTPATIENT
Start: 2021-11-22 | End: 2022-03-30

## 2021-11-22 RX ORDER — SOTALOL HYDROCHLORIDE 120 MG/1
120 TABLET ORAL 2 TIMES DAILY
COMMUNITY
End: 2022-03-30

## 2021-11-22 NOTE — PROGRESS NOTES
Vaccine Information Sheet, \"Influenza - Inactivated\"  given to Jennifer Acuña, or parent/legal guardian of  Jennifer Acuña and verbalized understanding. Patient responses:    Have you ever had a reaction to a flu vaccine? No  Do you have any current illness? No  Have you ever had Guillian Dover Syndrome? No  Do you have a serious allergy to any of the follow: Neomycin, Polymyxin, Thimerosal, eggs or egg products? No    Flu vaccine given per order. Please see immunization tab. Risks and benefits explained. Current VIS given.       Immunizations Administered     Name Date Dose Route    Influenza, Quadv, adjuvanted, 65 yrs +, IM, PF (Fluad) 11/22/2021 0.5 mL Intramuscular    Site: Deltoid- Right    Lot: [id number]    [address]: 83033-610-22

## 2021-11-23 LAB
ESTIMATED AVERAGE GLUCOSE: 134.1 MG/DL
HBA1C MFR BLD: 6.3 %

## 2022-01-18 ENCOUNTER — TELEPHONE (OUTPATIENT)
Dept: FAMILY MEDICINE CLINIC | Age: 77
End: 2022-01-18

## 2022-03-13 NOTE — PROGRESS NOTES
Subjective:      Patient ID: Evelyn Harris is a 78 y.o. male. Diabetes  He presents for his follow-up diabetic visit. He has type 2 diabetes mellitus. His disease course has been stable. Hypoglycemia symptoms include nervousness/anxiousness. Pertinent negatives for diabetes include no chest pain, no foot paresthesias, no foot ulcerations, no polydipsia and no polyuria. There are no hypoglycemic complications. Symptoms are stable. There are no diabetic complications. Risk factors for coronary artery disease include diabetes mellitus, dyslipidemia, family history, male sex and obesity. Current diabetic treatment includes oral agent (triple therapy). He is compliant with treatment all of the time. His weight is stable. He is following a generally healthy diet. He participates in exercise intermittently. Home blood sugar record trend: Patient has not been testing his sugars. An ACE inhibitor/angiotensin II receptor blocker is not being taken. Eye exam is not current. Hyperlipidemia:  Patient is tolerating and compliant with Simvastatin 40 mg daily. His last lipid check was on 11-22-21 and was at goal for LDL. Hypothyroidism:  Patient is tolerating and compliant with Synthroid 50 mcg daily. His TSH was normal on 11-22-21. Depression / Anxiety: Patient is tolerating and compliant with Celexa 40 mg 1/2 daily. He feels that the medication is helping with his symptoms. He is sleeping well and denies any suicidal ideation. He feels that he still needs to be on the medication. GERD:  Patient is tolerating and compliant with Protonix 20 mg daily. He feels that the medication completely controls his symptoms. Chronic Atrial Fibrillation:  Patient sees Dr. Yasmany Allan in [place] and is S/P pacemaker insertion. He takes Xarelto 20 mg daily and Betapace 120 mg BID. He may be needing an ablation in the near future. Obesity:  Patient weighed 280 on 11-22-21. He has lost 1 lb since then. Review of Systems   Cardiovascular: Negative for chest pain. Endocrine: Negative for polydipsia and polyuria. Psychiatric/Behavioral: Positive for dysphoric mood. Negative for suicidal ideas. The patient is nervous/anxious. /78   Ht 6' 1.5\" (1.867 m)   Wt 279 lb 12.8 oz (126.9 kg)   BMI 36.41 kg/m²    Objective:   Physical Exam  Vitals reviewed. Constitutional:       General: He is not in acute distress. Appearance: He is well-developed. HENT:      Head: Normocephalic. Right Ear: External ear normal.      Left Ear: External ear normal.   Neck:      Thyroid: No thyromegaly. Vascular: No carotid bruit or JVD. Cardiovascular:      Rate and Rhythm: Normal rate. Rhythm irregular. Heart sounds: Normal heart sounds. No murmur heard. Pulmonary:      Effort: Pulmonary effort is normal.      Breath sounds: Normal breath sounds. No wheezing or rales. Abdominal:      Comments: Obese. Lymphadenopathy:      Cervical: No cervical adenopathy. Neurological:      Mental Status: He is alert and oriented to person, place, and time. Assessment:      Type 2 Diabetes  Hyperlipidemia  Hypothyroidism   Depression  Anxiety  GERD  Chronic Atrial Fibrillation   Obesity       Plan:      HgbA1C was 5.8 today (Down from 6.3 last time)  Urine Microalbumin   Reminded patient to have an eye exam.   Refilled medication  Rx given to have a Shingrix and Tdap shot at the pharmacy.     Reminded patient to do the Cologuard Test   Patient had an AWV done at home thru [address] [facility] 4 months for Diabetes         [address] Huang DO

## 2022-03-15 ENCOUNTER — TELEPHONE (OUTPATIENT)
Dept: FAMILY MEDICINE CLINIC | Age: 77
End: 2022-03-15

## 2022-03-15 ENCOUNTER — OFFICE VISIT (OUTPATIENT)
Dept: FAMILY MEDICINE CLINIC | Age: 77
End: 2022-03-15
Payer: MEDICARE

## 2022-03-15 VITALS
DIASTOLIC BLOOD PRESSURE: 78 MMHG | SYSTOLIC BLOOD PRESSURE: 108 MMHG | HEIGHT: 74 IN | WEIGHT: 279.8 LBS | BODY MASS INDEX: 35.91 KG/M2

## 2022-03-15 DIAGNOSIS — E11.9 TYPE 2 DIABETES MELLITUS WITHOUT COMPLICATION, WITHOUT LONG-TERM CURRENT USE OF INSULIN (HCC): Primary | ICD-10-CM

## 2022-03-15 DIAGNOSIS — E78.00 HYPERCHOLESTEROLEMIA: ICD-10-CM

## 2022-03-15 DIAGNOSIS — I48.20 CHRONIC ATRIAL FIBRILLATION (HCC): ICD-10-CM

## 2022-03-15 DIAGNOSIS — K21.9 GERD WITHOUT ESOPHAGITIS: ICD-10-CM

## 2022-03-15 DIAGNOSIS — E66.09 NON MORBID OBESITY DUE TO EXCESS CALORIES: ICD-10-CM

## 2022-03-15 DIAGNOSIS — F41.9 ANXIETY: ICD-10-CM

## 2022-03-15 DIAGNOSIS — Z23 NEED FOR TDAP VACCINATION: ICD-10-CM

## 2022-03-15 DIAGNOSIS — F32.4 MAJOR DEPRESSION SINGLE EPISODE, IN PARTIAL REMISSION (HCC): ICD-10-CM

## 2022-03-15 DIAGNOSIS — Z23 NEED FOR SHINGLES VACCINE: ICD-10-CM

## 2022-03-15 DIAGNOSIS — E03.9 HYPOTHYROIDISM (ACQUIRED): ICD-10-CM

## 2022-03-15 LAB
CREATININE URINE: 207.9 MG/DL (ref 39–259)
HBA1C MFR BLD: 5.8 %
MICROALBUMIN UR-MCNC: <1.2 MG/DL
MICROALBUMIN/CREAT UR-RTO: NORMAL MG/G (ref 0–30)

## 2022-03-15 PROCEDURE — 99214 OFFICE O/P EST MOD 30 MIN: CPT | Performed by: FAMILY MEDICINE

## 2022-03-15 PROCEDURE — 83036 HEMOGLOBIN GLYCOSYLATED A1C: CPT | Performed by: FAMILY MEDICINE

## 2022-03-15 RX ORDER — ZOSTER VACCINE RECOMBINANT, ADJUVANTED 50 MCG/0.5
0.5 KIT INTRAMUSCULAR ONCE
Qty: 0.5 ML | Refills: 0 | Status: SHIPPED | OUTPATIENT
Start: 2022-03-15 | End: 2022-03-15

## 2022-03-15 ASSESSMENT — PATIENT HEALTH QUESTIONNAIRE - PHQ9
8. MOVING OR SPEAKING SO SLOWLY THAT OTHER PEOPLE COULD HAVE NOTICED. OR THE OPPOSITE, BEING SO FIGETY OR RESTLESS THAT YOU HAVE BEEN MOVING AROUND A LOT MORE THAN USUAL: 0
3. TROUBLE FALLING OR STAYING ASLEEP: 0
SUM OF ALL RESPONSES TO PHQ9 QUESTIONS 1 & 2: 0
10. IF YOU CHECKED OFF ANY PROBLEMS, HOW DIFFICULT HAVE THESE PROBLEMS MADE IT FOR YOU TO DO YOUR WORK, TAKE CARE OF THINGS AT HOME, OR GET ALONG WITH OTHER PEOPLE: 0
SUM OF ALL RESPONSES TO PHQ QUESTIONS 1-9: 1
SUM OF ALL RESPONSES TO PHQ QUESTIONS 1-9: 1
7. TROUBLE CONCENTRATING ON THINGS, SUCH AS READING THE NEWSPAPER OR WATCHING TELEVISION: 0
SUM OF ALL RESPONSES TO PHQ QUESTIONS 1-9: 1
5. POOR APPETITE OR OVEREATING: 0
SUM OF ALL RESPONSES TO PHQ QUESTIONS 1-9: 1
9. THOUGHTS THAT YOU WOULD BE BETTER OFF DEAD, OR OF HURTING YOURSELF: 0
6. FEELING BAD ABOUT YOURSELF - OR THAT YOU ARE A FAILURE OR HAVE LET YOURSELF OR YOUR FAMILY DOWN: 0
2. FEELING DOWN, DEPRESSED OR HOPELESS: 0
4. FEELING TIRED OR HAVING LITTLE ENERGY: 1
1. LITTLE INTEREST OR PLEASURE IN DOING THINGS: 0

## 2022-03-15 NOTE — TELEPHONE ENCOUNTER
Patient called to inform Dr Tony Diamond that he had his second shingles vaccine on 9/8/2021 at [facility]

## 2022-03-22 ENCOUNTER — TELEPHONE (OUTPATIENT)
Dept: PHARMACY | Facility: CLINIC | Age: 77
End: 2022-03-22

## 2022-03-22 NOTE — TELEPHONE ENCOUNTER
[facility] HE[facility] CLINICAL PHARMACY: [facility]  Identified care gap per [employer]: [facility]: ACE/ARB, Diabetes and Statin adherence    Last Visit: 03/15/22    Patient also appears to be prescribed: Jardiance 25mg, Glimepiride 4mg, Simvastatin 40mg and Semaglutide (RYBELSUS) 7 MG TABS    Patient not found in Outcomes MTM    [address]    Per evoke  Portal:  [address] last filled on 03/08/22 for 30 day supply. Per evoke  Portal:  Glimepiride last filled on 12/25/21 for 90 day supply. New rx needed     Per evoke  Portal:  Rybelsus last filled on 11/29/21 for 90 day supply. Overdue    Lab Results   Component Value Date    LABA1C 5.8 03/15/2022    LABA1C 6.3 11/22/2021    LABA1C 7.3 07/21/2021     NOTE A1c <9%    STATIN ADHERENCE    Per evoke Portal:  Simvastatin last filled on 01/14/22 for 90 day supply. Lab Results   Component Value Date    CHOL 124 11/22/2021    TRIG 119 11/22/2021    HDL 33 (L) 11/22/2021    LDLCALC 67 11/22/2021     ALT   Date Value Ref Range Status   11/22/2021 23 10 - 40 U/L Final     AST   Date Value Ref Range Status   11/22/2021 23 15 - 37 U/L Final     The ASCVD Risk score (Media Pile., et al., 2013) failed to calculate for the following reasons: The valid total cholesterol range is 130 to 320 mg/dL     PLAN  The following are interventions that have been identified:   - Patient overdue refilling Rybelsus and active on home medication list.     Attempting to reach patient to review.  Left message asking for return call.      Called pt to discuss switching Rybelsus to a 90ds     Future Appointments   Date Time Provider Ryann Odonnell   6/16/2022  9:45 AM DO Toya Bell [address] // Department, toll free [phone], Option 1

## 2022-03-22 NOTE — LETTER
[place]  [address]        Margoth Hinds   [address]  [place] [address]           03/25/22     Dear Margoth Hinds,      We tried to reach you recently regarding your Semaglutide (RYBELSUS) 7 MG TABS, but were unable to reach you on the telephone. We have on file that you are currently taking Semaglutide (RYBELSUS) 7 MG TABS Daily. If you are no longer taking or taking differently, please call us at the number below so that we can discuss this and update your medication profile.      This medication can be filled for a 3-month supply to save you time and trips to the pharmacy  if you would like assistance in switching your prescriptions to a 3-month supply, please contact us        Sincerely,   [address] // Department, toll free [phone], Option 1

## 2022-03-25 NOTE — TELEPHONE ENCOUNTER
2nd attempt to contact this patient regarding the previous message**    CLINICAL PHARMACY: [facility]  Patient unavailable at the time of call. Left following message on home TAD: please call back at toll-free [phone] option 1 to retrieve previous message. Letter mailed to patient.     Sincerely,   [address]  // Department, toll free [phone], Option 1      For Pharmacy Admin Tracking Only     CPA in place:  No   Intervention Detail: Adherence Monitorin   Gap Closed?: No    Intervention Accepted By: Patient/Caregiver: 0   Time Spent (min): 10

## 2022-03-30 RX ORDER — CITALOPRAM 40 MG/1
TABLET ORAL
Qty: 45 TABLET | Refills: 0 | Status: SHIPPED | OUTPATIENT
Start: 2022-03-30 | End: 2022-06-28 | Stop reason: SDUPTHER

## 2022-03-30 RX ORDER — LEVOTHYROXINE SODIUM 0.05 MG/1
TABLET ORAL
Qty: 90 TABLET | Refills: 0 | Status: SHIPPED | OUTPATIENT
Start: 2022-03-30 | End: 2022-06-28 | Stop reason: SDUPTHER

## 2022-03-30 RX ORDER — SIMVASTATIN 40 MG
TABLET ORAL
Qty: 90 TABLET | Refills: 0 | Status: SHIPPED | OUTPATIENT
Start: 2022-03-30 | End: 2022-06-28

## 2022-03-30 RX ORDER — SOTALOL HYDROCHLORIDE 120 MG/1
TABLET ORAL
Qty: 180 TABLET | Refills: 0 | Status: SHIPPED | OUTPATIENT
Start: 2022-03-30 | End: 2022-06-28

## 2022-03-30 RX ORDER — GLIMEPIRIDE 4 MG/1
TABLET ORAL
Qty: 90 TABLET | Refills: 0 | Status: SHIPPED | OUTPATIENT
Start: 2022-03-30 | End: 2022-06-28

## 2022-04-13 ENCOUNTER — TELEPHONE (OUTPATIENT)
Dept: FAMILY MEDICINE CLINIC | Age: 77
End: 2022-04-13

## 2022-04-13 NOTE — TELEPHONE ENCOUNTER
Call placed to patient. He is aware of below message, and verbalized understanding. He had no further questions at this time and will get back to us with the preferred GPL-1 product.

## 2022-04-13 NOTE — TELEPHONE ENCOUNTER
Pt called to let Dr Corinne Canada know that the Roise Mages is too expensive. He states that it was working for him. He went to pick it up this time and it was over $700. He wants to know if there is something cheaper.  Please give pt a call [phone]

## 2022-04-13 NOTE — TELEPHONE ENCOUNTER
Unfortunately, the only generics for diabetes are Amaryl (which he is on) and Metformin (which he does not tolerate). The Rybelsus is in a class of medications called GLP-1. It is the only one that is a pill. The others are self injected (usually once per week). Sometimes the insurance company picks one medication from every family as their preferred medication. I have no way to know which one they pick and it sometimes changes. Please have patient check with his insurance or pharmacist to see which GLP-1 produce is preferred and I will prescribe that.

## 2022-04-13 NOTE — TELEPHONE ENCOUNTER
Pt called back to let Dr Guero Berumen now that after speaking with his insurance the only medicine in the category is Rybelsus.

## 2022-04-13 NOTE — TELEPHONE ENCOUNTER
Pt called  Back to know tat he was turned down right away for patient assistance. He also was told that there is no generic.  Please give pt a call [phone]

## 2022-04-13 NOTE — TELEPHONE ENCOUNTER
Have patient visit the website [employer] and apply for patient assistance. Often, they will send it to his house for free.

## 2022-04-13 NOTE — TELEPHONE ENCOUNTER
Call placed to patient. He is aware to apply for assistance on [email or web address] com, informed patient to call us with result if he is unable to get assistance for price.

## 2022-04-13 NOTE — TELEPHONE ENCOUNTER
That is not true. Trulicity, Bydureon and Victoza are in the GLP-1 class but they are injected. Thalidomide Counseling: I discussed with the patient the risks of thalidomide including but not limited to birth defects, anxiety, weakness, chest pain, dizziness, cough and severe allergy.

## 2022-04-14 NOTE — TELEPHONE ENCOUNTER
Pt called back and was advised of the other options. He states that he found a medicine that is a pill that helps with the A1C. He wants to see if Glimepramide is covered and will work for him.  Please give pt a call [phone]

## 2022-04-14 NOTE — TELEPHONE ENCOUNTER
Call placed to patient. Patient stated he is not taking Glimepramide he is taking gilmepiride patient insisted that these are different medications.

## 2022-04-15 NOTE — TELEPHONE ENCOUNTER
Call placed to patient. He apologized as her realized below medication was the same and he was spelling it wrong. Patient stated that he wants to try Obdulia Rahman but needs to ronaldo Mona first to see how much medication will cost. Stated he will call our office back.

## 2022-04-18 NOTE — TELEPHONE ENCOUNTER
Patient states he does not want to take the [place]. States he can't afford Rybelsus. States his daughter-in-law is a pharmacist and recommended Januvia. Patient would like Kirsty sent in. Please return call to confirm.      [facility] [address] [place] [address], [address] [place] [address] [phone]

## 2022-06-05 RX ORDER — EMPAGLIFLOZIN 25 MG/1
TABLET, FILM COATED ORAL
Qty: 90 TABLET | Refills: 0 | Status: SHIPPED | OUTPATIENT
Start: 2022-06-05 | End: 2022-06-28 | Stop reason: SDUPTHER

## 2022-06-26 PROBLEM — L97.212 NON-PRESSURE CHRONIC ULCER OF RIGHT CALF WITH FAT LAYER EXPOSED (HCC): Status: RESOLVED | Noted: 2021-05-06 | Resolved: 2022-06-26

## 2022-06-26 NOTE — PROGRESS NOTES
Subjective:      Patient ID: Roshni Her is a 78 y.o. male. Diabetes  He presents for his follow-up diabetic visit. He has type 2 diabetes mellitus. His disease course has been stable. Hypoglycemia symptoms include nervousness/anxiousness. Pertinent negatives for diabetes include no chest pain, no foot paresthesias, no foot ulcerations, no polydipsia and no polyuria. There are no hypoglycemic complications. Symptoms are stable. There are no diabetic complications. Risk factors for coronary artery disease include diabetes mellitus, dyslipidemia, family history, male sex and obesity. Current diabetic treatment includes oral agent (triple therapy). He is compliant with treatment all of the time. His weight is increasing steadily. He is following a generally healthy diet. He participates in exercise intermittently. His overall blood glucose range is 130-140 mg/dl. An ACE inhibitor/angiotensin II receptor blocker is not being taken. Eye exam is not current. Hyperlipidemia:  Patient is tolerating and compliant with Simvastatin 40 mg daily. His last lipid check was on 11-22-21 and was at goal for LDL. Hypothyroidism:  Patient is tolerating and compliant with Synthroid 50 mcg daily. His TSH was normal on 11-22-21. Depression / Anxiety: Patient is tolerating and compliant with Celexa 40 mg 1/2 daily. He feels that the medication is helping with his symptoms. He is sleeping well and denies any suicidal ideation. He feels that he still needs to be on the medication. GERD:  Patient is tolerating and compliant with Protonix 20 mg daily. He feels that the medication completely controls his symptoms. Chronic Atrial Fibrillation:  Patient sees Dr. Aileen Magaña / [facility]ht[facility] and is S/P pacemaker insertion. He takes Xarelto 20 mg daily and Betapace 120 mg BID. Obesity:  Patient weighed 279 on 3-15-22. He has gained 9 lbs since then. Review of Systems   Cardiovascular: Negative for chest pain. Endocrine: Negative for polydipsia and polyuria. Psychiatric/Behavioral: Positive for dysphoric mood. Negative for suicidal ideas. The patient is nervous/anxious. /70   Ht 6' 1\" (1.854 m)   Wt 288 lb 3.2 oz (130.7 kg)   BMI 38.02 kg/m²    Objective:   Physical Exam  Vitals reviewed. Constitutional:       General: He is not in acute distress. Appearance: He is well-developed. HENT:      Head: Normocephalic. Right Ear: External ear normal.      Left Ear: External ear normal.   Neck:      Thyroid: No thyromegaly. Vascular: No carotid bruit or JVD. Cardiovascular:      Rate and Rhythm: Normal rate. Rhythm irregular. Heart sounds: Normal heart sounds. No murmur heard. Pulmonary:      Effort: Pulmonary effort is normal.      Breath sounds: Normal breath sounds. No wheezing or rales. Abdominal:      Comments: Obese. Lymphadenopathy:      Cervical: No cervical adenopathy. Neurological:      Mental Status: He is alert and oriented to person, place, and time. Assessment:      Type 2 Diabetes  Hyperlipidemia  Hypothyroidism   Depression  Anxiety  GERD  Chronic Atrial Fibrillation   Obesity       Plan:      HgbA1C was 6.6 today (up from 5.8 last time). Reminded patient to have an eye exam.   Refilled medication  Prevnar 20 shot given  Rx given to have a Tdap shot at the pharmacy.     Patient had an AWV done at home thru [address] [facility] 4 months for Diabetes         Amanda Martinez DO

## 2022-06-28 ENCOUNTER — OFFICE VISIT (OUTPATIENT)
Dept: FAMILY MEDICINE CLINIC | Age: 77
End: 2022-06-28
Payer: MEDICARE

## 2022-06-28 VITALS
WEIGHT: 288.2 LBS | BODY MASS INDEX: 38.2 KG/M2 | SYSTOLIC BLOOD PRESSURE: 110 MMHG | HEIGHT: 73 IN | DIASTOLIC BLOOD PRESSURE: 70 MMHG

## 2022-06-28 DIAGNOSIS — E78.00 HYPERCHOLESTEROLEMIA: ICD-10-CM

## 2022-06-28 DIAGNOSIS — I48.20 CHRONIC ATRIAL FIBRILLATION (HCC): ICD-10-CM

## 2022-06-28 DIAGNOSIS — Z23 NEED FOR PNEUMOCOCCAL VACCINATION: ICD-10-CM

## 2022-06-28 DIAGNOSIS — E03.9 HYPOTHYROIDISM (ACQUIRED): ICD-10-CM

## 2022-06-28 DIAGNOSIS — F32.4 MAJOR DEPRESSION SINGLE EPISODE, IN PARTIAL REMISSION (HCC): ICD-10-CM

## 2022-06-28 DIAGNOSIS — E11.9 TYPE 2 DIABETES MELLITUS WITHOUT COMPLICATION, WITHOUT LONG-TERM CURRENT USE OF INSULIN (HCC): Primary | ICD-10-CM

## 2022-06-28 DIAGNOSIS — E66.09 NON MORBID OBESITY DUE TO EXCESS CALORIES: ICD-10-CM

## 2022-06-28 DIAGNOSIS — K21.9 GERD WITHOUT ESOPHAGITIS: ICD-10-CM

## 2022-06-28 DIAGNOSIS — F41.9 ANXIETY: ICD-10-CM

## 2022-06-28 DIAGNOSIS — Z23 NEED FOR TDAP VACCINATION: ICD-10-CM

## 2022-06-28 LAB — HBA1C MFR BLD: 6.6 %

## 2022-06-28 PROCEDURE — 90677 PCV20 VACCINE IM: CPT | Performed by: FAMILY MEDICINE

## 2022-06-28 PROCEDURE — 3044F HG A1C LEVEL LT 7.0%: CPT | Performed by: FAMILY MEDICINE

## 2022-06-28 PROCEDURE — 1123F ACP DISCUSS/DSCN MKR DOCD: CPT | Performed by: FAMILY MEDICINE

## 2022-06-28 PROCEDURE — 83036 HEMOGLOBIN GLYCOSYLATED A1C: CPT | Performed by: FAMILY MEDICINE

## 2022-06-28 PROCEDURE — 99214 OFFICE O/P EST MOD 30 MIN: CPT | Performed by: FAMILY MEDICINE

## 2022-06-28 RX ORDER — CITALOPRAM 40 MG/1
20 TABLET ORAL DAILY
Qty: 45 TABLET | Refills: 1 | Status: SHIPPED | OUTPATIENT
Start: 2022-06-28 | End: 2022-08-04

## 2022-06-28 RX ORDER — GLIMEPIRIDE 4 MG/1
TABLET ORAL
Qty: 90 TABLET | Refills: 0 | Status: SHIPPED | OUTPATIENT
Start: 2022-06-28 | End: 2022-08-04

## 2022-06-28 RX ORDER — PANTOPRAZOLE SODIUM 20 MG/1
TABLET, DELAYED RELEASE ORAL
Qty: 90 TABLET | Refills: 1 | Status: SHIPPED | OUTPATIENT
Start: 2022-06-28

## 2022-06-28 RX ORDER — SIMVASTATIN 40 MG
TABLET ORAL
Qty: 90 TABLET | Refills: 0 | Status: SHIPPED | OUTPATIENT
Start: 2022-06-28 | End: 2022-09-30

## 2022-06-28 RX ORDER — SOTALOL HYDROCHLORIDE 120 MG/1
TABLET ORAL
Qty: 180 TABLET | Refills: 0 | Status: SHIPPED | OUTPATIENT
Start: 2022-06-28 | End: 2022-09-30

## 2022-06-28 RX ORDER — LEVOTHYROXINE SODIUM 0.05 MG/1
50 TABLET ORAL DAILY
Qty: 90 TABLET | Refills: 1 | Status: SHIPPED | OUTPATIENT
Start: 2022-06-28 | End: 2022-08-04

## 2022-08-04 RX ORDER — GLIMEPIRIDE 4 MG/1
TABLET ORAL
Qty: 90 TABLET | Refills: 0 | Status: SHIPPED | OUTPATIENT
Start: 2022-08-04

## 2022-08-04 RX ORDER — LEVOTHYROXINE SODIUM 0.05 MG/1
TABLET ORAL
Qty: 90 TABLET | Refills: 0 | Status: SHIPPED | OUTPATIENT
Start: 2022-08-04

## 2022-08-04 RX ORDER — CITALOPRAM 40 MG/1
TABLET ORAL
Qty: 45 TABLET | Refills: 0 | Status: SHIPPED | OUTPATIENT
Start: 2022-08-04

## 2022-09-30 RX ORDER — SIMVASTATIN 40 MG
TABLET ORAL
Qty: 90 TABLET | Refills: 0 | Status: SHIPPED | OUTPATIENT
Start: 2022-09-30

## 2022-09-30 RX ORDER — SOTALOL HYDROCHLORIDE 120 MG/1
TABLET ORAL
Qty: 180 TABLET | Refills: 0 | Status: SHIPPED | OUTPATIENT
Start: 2022-09-30

## 2022-10-24 NOTE — PROGRESS NOTES
Subjective:      Patient ID: Roe Goetz is a 78 y.o. male. Diabetes  He presents for his follow-up diabetic visit. He has type 2 diabetes mellitus. The initial diagnosis of diabetes was made 10 years ago. His disease course has been stable. Hypoglycemia symptoms include nervousness/anxiousness. Pertinent negatives for diabetes include no chest pain, no foot paresthesias, no foot ulcerations, no polydipsia and no polyuria. There are no hypoglycemic complications. Symptoms are stable. There are no diabetic complications. Risk factors for coronary artery disease include diabetes mellitus, dyslipidemia, family history, male sex and obesity. Current diabetic treatment includes oral agent (triple therapy). He is compliant with treatment all of the time. His weight is increasing steadily. He is following a generally healthy diet. He participates in exercise intermittently. His overall blood glucose range is 130-140 mg/dl. An ACE inhibitor/angiotensin II receptor blocker is not being taken. Eye exam is not current. Hyperlipidemia:  Patient is tolerating and compliant with Simvastatin 40 mg daily. He is fasting today for a lipid recheck. Hypothyroidism:  Patient is tolerating and compliant with Synthroid 50 mcg daily. He is due today for a TSH recheck. Depression / Anxiety: Patient is tolerating and compliant with Celexa 40 mg 1/2 daily. He feels that the medication is helping with his symptoms. He is sleeping well and denies any suicidal ideation. He feels that he still needs to be on the medication. GERD:  Patient is tolerating and compliant with Protonix 20 mg daily. He feels that the medication completely controls his symptoms. Chronic Atrial Fibrillation:  Patient sees Dr. Alexa Stone / Kylee Rudolph and is S/P pacemaker insertion. He takes Xarelto 20 mg daily and Betapace 120 mg BID. Obesity:  Patient weighed 288 on 6-28-22. He has lost 1 lb since then.       Review of Systems Cardiovascular:  Negative for chest pain. Endocrine: Negative for polydipsia and polyuria. Psychiatric/Behavioral:  Positive for dysphoric mood. Negative for suicidal ideas. The patient is nervous/anxious. /71   Pulse 74   Ht 6' 1\" (1.854 m)   Wt 287 lb (130.2 kg)   BMI 37.87 kg/m²    Objective:   Physical Exam  Vitals reviewed. Constitutional:       General: He is not in acute distress. Appearance: He is well-developed. HENT:      Head: Normocephalic. Right Ear: External ear normal.      Left Ear: External ear normal.   Neck:      Thyroid: No thyromegaly. Vascular: No carotid bruit or JVD. Cardiovascular:      Rate and Rhythm: Normal rate. Rhythm irregular. Heart sounds: Normal heart sounds. No murmur heard. Pulmonary:      Effort: Pulmonary effort is normal.      Breath sounds: Normal breath sounds. No wheezing or rales. Abdominal:      Comments: Obese. Lymphadenopathy:      Cervical: No cervical adenopathy. Neurological:      Mental Status: He is alert and oriented to person, place, and time. Comments: Bilateral foot exam shows no lesions. Bilateral monofilament exam is normal.         Assessment:      Type 2 Diabetes  Hyperlipidemia  Hypothyroidism   Depression  Anxiety  GERD  Chronic Atrial Fibrillation   Obesity       Plan:      HgbA1C, Chem 7, Lipid Panel, AST, ALT, TSH  Foot Exam Done   Reminded patient to have an eye exam.   Refilled medication  Rx given to have a Tdap shot at the pharmacy. Recommended that patient have an AWV.     RTO 4 months for Diabetes         VIPIN LANDIN,

## 2022-10-26 ENCOUNTER — OFFICE VISIT (OUTPATIENT)
Dept: FAMILY MEDICINE CLINIC | Age: 77
End: 2022-10-26
Payer: MEDICARE

## 2022-10-26 VITALS
HEART RATE: 74 BPM | SYSTOLIC BLOOD PRESSURE: 107 MMHG | WEIGHT: 287 LBS | HEIGHT: 73 IN | BODY MASS INDEX: 38.04 KG/M2 | DIASTOLIC BLOOD PRESSURE: 71 MMHG

## 2022-10-26 DIAGNOSIS — F32.4 MAJOR DEPRESSION SINGLE EPISODE, IN PARTIAL REMISSION (HCC): ICD-10-CM

## 2022-10-26 DIAGNOSIS — Z23 NEED FOR INFLUENZA VACCINATION: ICD-10-CM

## 2022-10-26 DIAGNOSIS — K21.9 GERD WITHOUT ESOPHAGITIS: ICD-10-CM

## 2022-10-26 DIAGNOSIS — E11.9 TYPE 2 DIABETES MELLITUS WITHOUT COMPLICATION, WITHOUT LONG-TERM CURRENT USE OF INSULIN (HCC): Primary | ICD-10-CM

## 2022-10-26 DIAGNOSIS — F41.9 ANXIETY: ICD-10-CM

## 2022-10-26 DIAGNOSIS — I48.20 CHRONIC ATRIAL FIBRILLATION (HCC): ICD-10-CM

## 2022-10-26 DIAGNOSIS — Z23 NEED FOR TDAP VACCINATION: ICD-10-CM

## 2022-10-26 DIAGNOSIS — E66.09 NON MORBID OBESITY DUE TO EXCESS CALORIES: ICD-10-CM

## 2022-10-26 DIAGNOSIS — E03.9 HYPOTHYROIDISM (ACQUIRED): ICD-10-CM

## 2022-10-26 DIAGNOSIS — E78.00 HYPERCHOLESTEROLEMIA: ICD-10-CM

## 2022-10-26 LAB
ALT SERPL-CCNC: 22 U/L (ref 10–40)
ANION GAP SERPL CALCULATED.3IONS-SCNC: 11 MMOL/L (ref 3–16)
AST SERPL-CCNC: 23 U/L (ref 15–37)
BUN BLDV-MCNC: 12 MG/DL (ref 7–20)
CALCIUM SERPL-MCNC: 10.4 MG/DL (ref 8.3–10.6)
CHLORIDE BLD-SCNC: 101 MMOL/L (ref 99–110)
CHOLESTEROL, TOTAL: 129 MG/DL (ref 0–199)
CO2: 27 MMOL/L (ref 21–32)
CREAT SERPL-MCNC: 1 MG/DL (ref 0.8–1.3)
GFR SERPL CREATININE-BSD FRML MDRD: >60 ML/MIN/{1.73_M2}
GLUCOSE BLD-MCNC: 141 MG/DL (ref 70–99)
HDLC SERPL-MCNC: 31 MG/DL (ref 40–60)
LDL CHOLESTEROL CALCULATED: 73 MG/DL
POTASSIUM SERPL-SCNC: 5.2 MMOL/L (ref 3.5–5.1)
SODIUM BLD-SCNC: 139 MMOL/L (ref 136–145)
TRIGL SERPL-MCNC: 124 MG/DL (ref 0–150)
TSH SERPL DL<=0.05 MIU/L-ACNC: 2.62 UIU/ML (ref 0.27–4.2)
VLDLC SERPL CALC-MCNC: 25 MG/DL

## 2022-10-26 PROCEDURE — 99214 OFFICE O/P EST MOD 30 MIN: CPT | Performed by: FAMILY MEDICINE

## 2022-10-26 PROCEDURE — 3044F HG A1C LEVEL LT 7.0%: CPT | Performed by: FAMILY MEDICINE

## 2022-10-26 PROCEDURE — 1123F ACP DISCUSS/DSCN MKR DOCD: CPT | Performed by: FAMILY MEDICINE

## 2022-10-26 PROCEDURE — 36415 COLL VENOUS BLD VENIPUNCTURE: CPT | Performed by: FAMILY MEDICINE

## 2022-10-27 LAB
ESTIMATED AVERAGE GLUCOSE: 154.2 MG/DL
HBA1C MFR BLD: 7 %

## 2022-11-29 ENCOUNTER — TELEPHONE (OUTPATIENT)
Dept: FAMILY MEDICINE CLINIC | Age: 77
End: 2022-11-29

## 2022-12-02 ENCOUNTER — OFFICE VISIT (OUTPATIENT)
Dept: FAMILY MEDICINE CLINIC | Age: 77
End: 2022-12-02
Payer: MEDICARE

## 2022-12-02 DIAGNOSIS — Z00.00 INITIAL MEDICARE ANNUAL WELLNESS VISIT: Primary | ICD-10-CM

## 2022-12-02 PROCEDURE — G0438 PPPS, INITIAL VISIT: HCPCS | Performed by: FAMILY MEDICINE

## 2022-12-02 PROCEDURE — 1123F ACP DISCUSS/DSCN MKR DOCD: CPT | Performed by: FAMILY MEDICINE

## 2022-12-02 ASSESSMENT — LIFESTYLE VARIABLES
HOW MANY STANDARD DRINKS CONTAINING ALCOHOL DO YOU HAVE ON A TYPICAL DAY: 1 OR 2
HOW OFTEN DO YOU HAVE A DRINK CONTAINING ALCOHOL: 2-3 TIMES A WEEK

## 2022-12-02 ASSESSMENT — PATIENT HEALTH QUESTIONNAIRE - PHQ9
SUM OF ALL RESPONSES TO PHQ QUESTIONS 1-9: 1
SUM OF ALL RESPONSES TO PHQ QUESTIONS 1-9: 1
2. FEELING DOWN, DEPRESSED OR HOPELESS: 0
10. IF YOU CHECKED OFF ANY PROBLEMS, HOW DIFFICULT HAVE THESE PROBLEMS MADE IT FOR YOU TO DO YOUR WORK, TAKE CARE OF THINGS AT HOME, OR GET ALONG WITH OTHER PEOPLE: 0
6. FEELING BAD ABOUT YOURSELF - OR THAT YOU ARE A FAILURE OR HAVE LET YOURSELF OR YOUR FAMILY DOWN: 0
4. FEELING TIRED OR HAVING LITTLE ENERGY: 1
5. POOR APPETITE OR OVEREATING: 0
7. TROUBLE CONCENTRATING ON THINGS, SUCH AS READING THE NEWSPAPER OR WATCHING TELEVISION: 0
SUM OF ALL RESPONSES TO PHQ QUESTIONS 1-9: 1
1. LITTLE INTEREST OR PLEASURE IN DOING THINGS: 0
SUM OF ALL RESPONSES TO PHQ QUESTIONS 1-9: 1
3. TROUBLE FALLING OR STAYING ASLEEP: 0
SUM OF ALL RESPONSES TO PHQ9 QUESTIONS 1 & 2: 0
8. MOVING OR SPEAKING SO SLOWLY THAT OTHER PEOPLE COULD HAVE NOTICED. OR THE OPPOSITE, BEING SO FIGETY OR RESTLESS THAT YOU HAVE BEEN MOVING AROUND A LOT MORE THAN USUAL: 0
9. THOUGHTS THAT YOU WOULD BE BETTER OFF DEAD, OR OF HURTING YOURSELF: 0

## 2022-12-02 NOTE — PROGRESS NOTES
Mica Edwards is a 78 y.o. male evaluated via telephone on 2022 for No chief complaint on file. .        Documentation:  I communicated with the patient and/or health care decision maker about (see below). Details of this discussion including any medical advice provided: (see below)    Total Time: minutes: 11-20 minutes    Mica Edwards was evaluated through a synchronous (real-time) audio encounter. Patient identification was verified at the start of the visit. He (or guardian if applicable) is aware that this is a billable service, which includes applicable co-pays. This visit was conducted with the patient's (and/or legal guardian's) verbal consent. He has not had a related appointment within my department in the past 7 days or scheduled within the next 24 hours. The patient was located at Home: [address] [place] [address]. The provider was located at [facility] (Appt Dept): [address] [place] [address] [address] [place] [address]. Note: not billable if this call serves to triage the patient into an appointment for the relevant concern    Lizbeth StantonperDO         Medicare Annual Wellness Visit  Name: Saima Houston Date: 2022   MRN: [id number] Sex: Male   Age: 78 y.o. Ethnicity: Non- / Non    : 1945 Race: White (non-)      Mica Edwards is here for No chief complaint on file. Screenings for behavioral, psychosocial and functional/safety risks, and cognitive dysfunction are all negative except as indicated below. These results, as well as otherpatient data from the Health Risk Assessment form, are documented in Flowsheets linked to this Encounter. Allergies   Allergen Reactions    Penicillins Anaphylaxis    Metformin Other (See Comments)     Flank pain       Prior to Visit Medications    Medication Sig Taking?  Authorizing Provider   simvastatin (ZOCOR) 40 MG tablet TAKE 1 TABLET BY MOUTH EVERY DAY AT NIGHT  Tin Ushaing, DO sotalol (BETAPACE) 120 MG tablet TAKE 1 TABLET BY MOUTH TWICE A DAY  Marcin Sousa,    glimepiride (AMARYL) 4 MG tablet TAKE 1 TABLET BY MOUTH EVERY DAY BEFORE BREAKFAST  Marcin Sousa,    levothyroxine (SYNTHROID) 50 MCG tablet TAKE 1 TABLET BY MOUTH EVERY DAY  Marcin Sousa, DO   citalopram (CELEXA) 40 MG tablet TAKE 1/2 TABLET BY MOUTH EVERY DAY  Imelda Sousa, DO   empagliflozin (JARDIANCE) 25 MG tablet Take 1 tablet by mouth daily  Nicholas Suarez, DO   SITagliptin (JANUVIA) 100 MG tablet Take 1 tablet by mouth daily  Nicholas Suarez, DO   rivaroxaban (XARELTO) 20 MG TABS tablet TAKE 1 TABLET BY MOUTH EVERY DAY  Marcin Sousa, DO   pantoprazole (PROTONIX) 20 MG tablet TAKE 1 TABLET BY MOUTH EVERY DAY  Marcin Sousa, DO   blood glucose test strips (ASCENSIA AUTODISC VI;ONE TOUCH ULTRA TEST VI) strip Use to test sugar once daily. Dx: E11.9  Nicholas Suarez, DO   ONE TOUCH ULTRASOFT LANCETS MISC Use to test sugar once daily.   Dx: E11.9  Nicholas Suarez, DO   ascorbic acid (VITAMIN C) 1000 MG tablet Take 1,000 mg by mouth daily  Historical Provider, MD   vitamin D (CHOLECALCIFEROL) 25 MCG (1000 UT) TABS tablet Take 25 mcg by mouth daily  Historical Provider, MD       Past Medical History:   Diagnosis Date    Anxiety     Chronic atrial fibrillation (HCC)     S/P Pacer / On Xarelto and Betapace / Dr. Leslie Carolina    GERD without esophagitis     Hypercholesterolemia     Hypothyroidism (acquired)     Major depression single episode, in partial remission ([address]     Non morbid obesity due to excess calories     SSS (sick sinus syndrome) ([facility]     S/P pacemaker / Dr. Leslie Carolina     Type 2 diabetes mellitus without complication, without long-term current use of insulin ([facility]      Past Surgical History:   Procedure Laterality Date    CARDIAC SURGERY      ABLATION    COLONOSCOPY      PACEMAKER PLACEMENT      TONSILLECTOMY AND ADENOIDECTOMY      TOTAL KNEE ARTHROPLASTY Right 4044    UMBILICAL HERNIA REPAIR      WISDOM TOOTH EXTRACTION         Family History   Problem Relation Age of Onset    Diabetes Mother     Stroke Father     Diabetes Sister     Hypertension Sister     Diabetes Brother     Heart Disease Brother         MI    Cancer Brother         Skin       CareTeam (Including outside providers/suppliers regularly involved in providing care):   Patient Care Team:  Kurtis Piedra DO as PCP - General (Family Medicine)  Kurtis Piedra DO as PCP - [facility] Empaneled Provider  Minor Serna (Internal Medicine)  Jenny Mcknight MD as Consulting Physician    Wt Readings from Last 3 Encounters:   10/26/22 287 lb (130.2 kg)   06/28/22 288 lb 3.2 oz (130.7 kg)   03/15/22 279 lb 12.8 oz (126.9 kg)     There were no vitals filed for this visit. Physical Exam    Patient's complete Health Risk Assessment andscreening values have been reviewed and are found in Flowsheets. The following problems were reviewed today and where indicated follow up appointments were madeand/or referrals ordered.     Positive Risk Factor Screeningswith Interventions:          General Health and ACP:  General  In general, how would you say your health is?: Very Good  In the past 7 days, have you experienced any of the following: New or Increased Pain, New or Increased Fatigue, Loneliness, Social Isolation, Stress or Anger?: (!) Yes  Select all that apply: (!) Anger (In traffic.)  Do you get the social and emotional support that you need?: Yes  Do you have a Living Will?: Yes    Advance Directives       Power of  Living Will ACP-Advance Directive ACP-Power of     Not on File Not on File Not on File Not on File        General Health Risk Interventions:  No needs    Health Habits/Nutrition:  Physical Activity: Insufficiently Active    Days of Exercise per Week: 2 days    Minutes of Exercise per Session: 20 min     Have you lost any weight without trying in the past 3 months?: No     Have you seen the dentist within the past year?: Appointment is scheduled  Health Habits/Nutrition Interventions:  Inadequate physical activity:  I recommended more exercise        Personalized Preventive Plan   Current Health Maintenance Status  Immunization History   Administered Date(s) Administered    COVID-19, PFIZER Bivalent BOOSTER, (age 12y+), IM, 30 mcg/0.3 mL dose 10/11/2022    COVID-19, PFIZER GRAY top, DO NOT Dilute, (age 15 y+), IM, 30 mcg/0.3 mL 05/13/2022    COVID-19, PFIZER PURPLE top, DILUTE for use, (age 15 y+), 30mcg/0.3mL 02/09/2021, 03/02/2021, 10/06/2021    Influenza A ([id number]) Vaccine PF IM 01/04/2010    Influenza Virus Vaccine 11/19/2012, 10/27/2017    Influenza Whole 11/19/2012    Influenza, FLUAD, (age 72 y+), Adjuvanted, 0.5mL 10/08/2020, 11/22/2021    Influenza, FLUARIX, FLULAVAL, FLUZONE (age 10 mo+) AND AFLURIA, (age 1 y+), PF, 0.5mL 10/27/2017    Influenza, FLUZONE (age 72 y+), High Dose, 0.7mL 10/20/2021, 10/11/2022    Influenza, High Dose (Fluzone 65 yrs and older) 10/06/2019    Influenza, Triv, inactivated, subunit, adjuvanted, IM (Fluad 65 yrs and older) 10/24/2018, 10/06/2019    Pneumococcal Conjugate 13-valent ([id number]) 07/13/2015    Pneumococcal Polysaccharide ([id number]) 11/19/2012    Pneumococcal Vaccine 07/13/2015    Pneumococcal conjugate PCV20, PF (Prevnar 20) 06/28/2022    Zoster Recombinant (Shingrix) 05/10/2021, 09/08/2021        Health Maintenance   Topic Date Due    Diabetic retinal exam  Never done    DTaP/Tdap/Td vaccine (1 - Tdap) Never done    Annual Wellness Visit (AWV)  Never done    Depression Monitoring  03/15/2023    Diabetic foot exam  10/26/2023    A1C test (Diabetic or Prediabetic)  10/26/2023    Lipids  10/26/2023    Colorectal Cancer Screen  12/02/2024    Flu vaccine  Completed    Shingles vaccine  Completed    Pneumococcal 65+ years Vaccine  Completed    COVID-19 Vaccine  Completed    Hepatitis C screen  Completed    Hepatitis A vaccine  Aged Out    Hib vaccine  Aged Out    Meningococcal (ACWY) vaccine  Aged Out     Recommendations forPreventive Services Due: see orders.   Recommended screening schedule for the next 5-10 years is provided to the patient inwritten form: see Patient Instructions/AVS.  RTO 3 months for Diabetes

## 2023-02-03 RX ORDER — SITAGLIPTIN 100 MG/1
TABLET, FILM COATED ORAL
Qty: 90 TABLET | Refills: 0 | Status: SHIPPED | OUTPATIENT
Start: 2023-02-03

## 2023-02-07 RX ORDER — PANTOPRAZOLE SODIUM 20 MG/1
TABLET, DELAYED RELEASE ORAL
Qty: 90 TABLET | Refills: 0 | Status: SHIPPED | OUTPATIENT
Start: 2023-02-07

## 2023-02-07 RX ORDER — SOTALOL HYDROCHLORIDE 120 MG/1
TABLET ORAL
Qty: 180 TABLET | Refills: 0 | Status: SHIPPED | OUTPATIENT
Start: 2023-02-07

## 2023-02-07 RX ORDER — SIMVASTATIN 40 MG
TABLET ORAL
Qty: 90 TABLET | Refills: 0 | Status: SHIPPED | OUTPATIENT
Start: 2023-02-07

## 2023-02-07 RX ORDER — LEVOTHYROXINE SODIUM 0.05 MG/1
TABLET ORAL
Qty: 90 TABLET | Refills: 0 | Status: SHIPPED | OUTPATIENT
Start: 2023-02-07

## 2023-02-07 RX ORDER — GLIMEPIRIDE 4 MG/1
TABLET ORAL
Qty: 90 TABLET | Refills: 0 | Status: SHIPPED | OUTPATIENT
Start: 2023-02-07

## 2023-02-07 RX ORDER — CITALOPRAM 40 MG/1
TABLET ORAL
Qty: 45 TABLET | Refills: 0 | Status: SHIPPED | OUTPATIENT
Start: 2023-02-07

## 2023-02-07 NOTE — TELEPHONE ENCOUNTER
Per patient, he had to switch pharmacies to [facility]. Last office visit 12/2/22 for AWV. Last diabetic follow-up 10/26/22. Future diabetic visit 3/6/23. Patient requesting multiple 90 day refills. Please call patient and confirm medications will be filled or if patient needs a sooner appointment. Patient states he sees Dr. Saundra Teixeira every 6 months.

## 2023-02-10 ENCOUNTER — TELEPHONE (OUTPATIENT)
Dept: FAMILY MEDICINE CLINIC | Age: 78
End: 2023-02-10

## 2023-02-16 RX ORDER — GLIMEPIRIDE 4 MG/1
TABLET ORAL
Qty: 90 TABLET | Refills: 0 | Status: SHIPPED | OUTPATIENT
Start: 2023-02-16

## 2023-02-23 NOTE — PROGRESS NOTES
Subjective:      Patient ID: Azul Coulter is a 78 y.o. male. Diabetes  He presents for his follow-up diabetic visit. He has type 2 diabetes mellitus. The initial diagnosis of diabetes was made 10 years ago. His disease course has been stable. Hypoglycemia symptoms include nervousness/anxiousness. Pertinent negatives for diabetes include no chest pain, no foot paresthesias, no foot ulcerations, no polydipsia and no polyuria. There are no hypoglycemic complications. Symptoms are stable. There are no diabetic complications. Risk factors for coronary artery disease include diabetes mellitus, dyslipidemia, family history, male sex and obesity. Current diabetic treatment includes oral agent (triple therapy). He is compliant with treatment all of the time. His weight is increasing steadily. He is following a generally healthy diet. He participates in exercise intermittently. His overall blood glucose range is 130-140 mg/dl. An ACE inhibitor/angiotensin II receptor blocker is not being taken. Eye exam is current (11-3-22). Hyperlipidemia:  Patient is tolerating and compliant with Simvastatin 40 mg daily. His last lipid check was on 10-26-22 and was at goal for LDL. Hypothyroidism:  Patient is tolerating and compliant with Synthroid 50 mcg daily. His TSH was normal on 10-26-22. Depression / Anxiety: Patient is tolerating and compliant with Celexa 40 mg 1/2 daily. He feels that the medication is helping with his symptoms. He is sleeping well and denies any suicidal ideation. He feels that he still needs to be on the medication. GERD:  Patient is tolerating and compliant with Protonix 20 mg daily. He feels that the medication completely controls his symptoms. Chronic Atrial Fibrillation:  Patient sees Dr. Chris Pyle / Risa Ellis and is S/P pacemaker insertion. He takes Xarelto 20 mg daily and Betapace 120 mg BID. Obesity:  Patient weighed 287 on 10-26-22. He has gained 1 lb since then. Review of Systems   Cardiovascular:  Negative for chest pain. Endocrine: Negative for polydipsia and polyuria. Psychiatric/Behavioral:  Positive for dysphoric mood. Negative for suicidal ideas. The patient is nervous/anxious. /72   Ht 6' 1\" (1.854 m)   Wt 288 lb 12.8 oz (131 kg)   BMI 38.10 kg/m²    Objective:   Physical Exam  Vitals reviewed. Constitutional:       General: He is not in acute distress. Appearance: He is well-developed. HENT:      Head: Normocephalic. Right Ear: External ear normal.      Left Ear: External ear normal.   Neck:      Thyroid: No thyromegaly. Vascular: No carotid bruit or JVD. Cardiovascular:      Rate and Rhythm: Normal rate. Rhythm irregular. Heart sounds: Normal heart sounds. No murmur heard. Pulmonary:      Effort: Pulmonary effort is normal.      Breath sounds: Normal breath sounds. No wheezing or rales. Abdominal:      Comments: Obese. Lymphadenopathy:      Cervical: No cervical adenopathy. Neurological:      Mental Status: He is alert and oriented to person, place, and time. Assessment:      Type 2 Diabetes  Hyperlipidemia  Hypothyroidism   Depression  Anxiety  GERD  Chronic Atrial Fibrillation   Obesity       Plan:      HgbA1C was 6.9 today (Down from 7.0 last time)  Refilled medication  Reminded to have a Tdap shot at the pharmacy.     RTO 4 months for Diabetes         VIPIN LANDIN DO

## 2023-03-06 ENCOUNTER — OFFICE VISIT (OUTPATIENT)
Dept: FAMILY MEDICINE CLINIC | Age: 78
End: 2023-03-06
Payer: MEDICARE

## 2023-03-06 VITALS
HEIGHT: 73 IN | WEIGHT: 288.8 LBS | SYSTOLIC BLOOD PRESSURE: 112 MMHG | DIASTOLIC BLOOD PRESSURE: 72 MMHG | BODY MASS INDEX: 38.28 KG/M2

## 2023-03-06 DIAGNOSIS — I48.20 CHRONIC ATRIAL FIBRILLATION (HCC): ICD-10-CM

## 2023-03-06 DIAGNOSIS — E03.9 HYPOTHYROIDISM (ACQUIRED): ICD-10-CM

## 2023-03-06 DIAGNOSIS — F41.9 ANXIETY: ICD-10-CM

## 2023-03-06 DIAGNOSIS — K21.9 GERD WITHOUT ESOPHAGITIS: ICD-10-CM

## 2023-03-06 DIAGNOSIS — E78.00 HYPERCHOLESTEROLEMIA: ICD-10-CM

## 2023-03-06 DIAGNOSIS — E11.9 TYPE 2 DIABETES MELLITUS WITHOUT COMPLICATION, WITHOUT LONG-TERM CURRENT USE OF INSULIN (HCC): Primary | ICD-10-CM

## 2023-03-06 DIAGNOSIS — E66.09 NON MORBID OBESITY DUE TO EXCESS CALORIES: ICD-10-CM

## 2023-03-06 DIAGNOSIS — F32.4 MAJOR DEPRESSION SINGLE EPISODE, IN PARTIAL REMISSION (HCC): ICD-10-CM

## 2023-03-06 LAB — HBA1C MFR BLD: 6.9 %

## 2023-03-06 PROCEDURE — 83036 HEMOGLOBIN GLYCOSYLATED A1C: CPT | Performed by: FAMILY MEDICINE

## 2023-03-06 PROCEDURE — 99214 OFFICE O/P EST MOD 30 MIN: CPT | Performed by: FAMILY MEDICINE

## 2023-03-06 PROCEDURE — 1123F ACP DISCUSS/DSCN MKR DOCD: CPT | Performed by: FAMILY MEDICINE

## 2023-03-06 ASSESSMENT — PATIENT HEALTH QUESTIONNAIRE - PHQ9
2. FEELING DOWN, DEPRESSED OR HOPELESS: 0
8. MOVING OR SPEAKING SO SLOWLY THAT OTHER PEOPLE COULD HAVE NOTICED. OR THE OPPOSITE, BEING SO FIGETY OR RESTLESS THAT YOU HAVE BEEN MOVING AROUND A LOT MORE THAN USUAL: 0
5. POOR APPETITE OR OVEREATING: 0
10. IF YOU CHECKED OFF ANY PROBLEMS, HOW DIFFICULT HAVE THESE PROBLEMS MADE IT FOR YOU TO DO YOUR WORK, TAKE CARE OF THINGS AT HOME, OR GET ALONG WITH OTHER PEOPLE: 0
1. LITTLE INTEREST OR PLEASURE IN DOING THINGS: 0
7. TROUBLE CONCENTRATING ON THINGS, SUCH AS READING THE NEWSPAPER OR WATCHING TELEVISION: 0
SUM OF ALL RESPONSES TO PHQ QUESTIONS 1-9: 0
6. FEELING BAD ABOUT YOURSELF - OR THAT YOU ARE A FAILURE OR HAVE LET YOURSELF OR YOUR FAMILY DOWN: 0
SUM OF ALL RESPONSES TO PHQ QUESTIONS 1-9: 0
SUM OF ALL RESPONSES TO PHQ9 QUESTIONS 1 & 2: 0
9. THOUGHTS THAT YOU WOULD BE BETTER OFF DEAD, OR OF HURTING YOURSELF: 0
SUM OF ALL RESPONSES TO PHQ QUESTIONS 1-9: 0
4. FEELING TIRED OR HAVING LITTLE ENERGY: 0
SUM OF ALL RESPONSES TO PHQ QUESTIONS 1-9: 0
3. TROUBLE FALLING OR STAYING ASLEEP: 0

## 2023-05-05 ENCOUNTER — TELEPHONE (OUTPATIENT)
Dept: FAMILY MEDICINE CLINIC | Age: 78
End: 2023-05-05

## 2023-05-08 ENCOUNTER — APPOINTMENT (OUTPATIENT)
Dept: CT IMAGING | Age: 78
End: 2023-05-08
Payer: MEDICARE

## 2023-05-08 ENCOUNTER — HOSPITAL ENCOUNTER (EMERGENCY)
Age: 78
Discharge: HOME | End: 2023-05-08
Payer: MEDICARE

## 2023-05-08 VITALS
HEIGHT: 74 IN | RESPIRATION RATE: 18 BRPM | TEMPERATURE: 98.9 F | OXYGEN SATURATION: 98 % | BODY MASS INDEX: 36.5 KG/M2 | SYSTOLIC BLOOD PRESSURE: 131 MMHG | HEART RATE: 72 BPM | WEIGHT: 284.39 LBS | DIASTOLIC BLOOD PRESSURE: 89 MMHG

## 2023-05-08 DIAGNOSIS — K11.20 PAROTIDITIS: Primary | ICD-10-CM

## 2023-05-08 LAB
ALBUMIN SERPL-MCNC: 4.3 G/DL (ref 3.4–5)
ALBUMIN/GLOB SERPL: 1.6 {RATIO} (ref 1.1–2.2)
ALP SERPL-CCNC: 95 U/L (ref 40–129)
ALT SERPL-CCNC: 16 U/L (ref 10–40)
ANION GAP SERPL CALCULATED.3IONS-SCNC: 11 MMOL/L (ref 3–16)
AST SERPL-CCNC: 17 U/L (ref 15–37)
BASOPHILS # BLD: 0 K/UL (ref 0–0.2)
BASOPHILS NFR BLD: 0.5 %
BILIRUB SERPL-MCNC: 0.5 MG/DL (ref 0–1)
BUN SERPL-MCNC: 8 MG/DL (ref 7–20)
CALCIUM SERPL-MCNC: 9.9 MG/DL (ref 8.3–10.6)
CHLORIDE SERPL-SCNC: 99 MMOL/L (ref 99–110)
CO2 SERPL-SCNC: 25 MMOL/L (ref 21–32)
CREAT SERPL-MCNC: 0.8 MG/DL (ref 0.8–1.3)
DEPRECATED RDW RBC AUTO: 12.3 % (ref 12.4–15.4)
EOSINOPHIL # BLD: 0.2 K/UL (ref 0–0.6)
EOSINOPHIL NFR BLD: 2.8 %
GFR SERPLBLD CREATININE-BSD FMLA CKD-EPI: >60 ML/MIN/{1.73_M2}
GLUCOSE SERPL-MCNC: 187 MG/DL (ref 70–99)
HCT VFR BLD AUTO: 50.4 % (ref 40.5–52.5)
HGB BLD-MCNC: 16.2 G/DL (ref 13.5–17.5)
LACTATE BLDV-SCNC: 1.5 MMOL/L (ref 0.4–1.9)
LYMPHOCYTES # BLD: 1.1 K/UL (ref 1–5.1)
LYMPHOCYTES NFR BLD: 14.8 %
MCH RBC QN AUTO: 32.4 PG (ref 26–34)
MCHC RBC AUTO-ENTMCNC: 32 G/DL (ref 31–36)
MCV RBC AUTO: 101 FL (ref 80–100)
MONOCYTES # BLD: 0.6 K/UL (ref 0–1.3)
MONOCYTES NFR BLD: 8.7 %
NEUTROPHILS # BLD: 5.3 K/UL (ref 1.7–7.7)
NEUTROPHILS NFR BLD: 73.2 %
PLATELET # BLD AUTO: 170 K/UL (ref 135–450)
PMV BLD AUTO: 10.4 FL (ref 5–10.5)
POTASSIUM SERPL-SCNC: 4.4 MMOL/L (ref 3.5–5.1)
PROT SERPL-MCNC: 7 G/DL (ref 6.4–8.2)
RBC # BLD AUTO: 4.99 M/UL (ref 4.2–5.9)
SODIUM SERPL-SCNC: 135 MMOL/L (ref 136–145)
WBC # BLD AUTO: 7.2 K/UL (ref 4–11)

## 2023-05-08 PROCEDURE — 70491 CT SOFT TISSUE NECK W/DYE: CPT

## 2023-05-08 PROCEDURE — 80053 COMPREHEN METABOLIC PANEL: CPT

## 2023-05-08 PROCEDURE — 6360000004 HC RX CONTRAST MEDICATION: Performed by: GENERAL ACUTE CARE HOSPITAL

## 2023-05-08 PROCEDURE — 83605 ASSAY OF LACTIC ACID: CPT

## 2023-05-08 PROCEDURE — 6370000000 HC RX 637 (ALT 250 FOR IP): Performed by: GENERAL ACUTE CARE HOSPITAL

## 2023-05-08 PROCEDURE — 85025 COMPLETE CBC W/AUTO DIFF WBC: CPT

## 2023-05-08 PROCEDURE — 87040 BLOOD CULTURE FOR BACTERIA: CPT

## 2023-05-08 PROCEDURE — 99285 EMERGENCY DEPT VISIT HI MDM: CPT

## 2023-05-08 RX ORDER — CEFDINIR 300 MG/1
300 CAPSULE ORAL 2 TIMES DAILY
Qty: 14 CAPSULE | Refills: 0 | Status: SHIPPED | OUTPATIENT
Start: 2023-05-08 | End: 2023-05-15

## 2023-05-08 RX ORDER — METRONIDAZOLE 500 MG/1
500 TABLET ORAL 2 TIMES DAILY
Qty: 14 TABLET | Refills: 0 | Status: SHIPPED | OUTPATIENT
Start: 2023-05-08 | End: 2023-05-15

## 2023-05-08 RX ORDER — HYDROCODONE BITARTRATE AND ACETAMINOPHEN 5; 325 MG/1; MG/1
1 TABLET ORAL ONCE
Status: COMPLETED | OUTPATIENT
Start: 2023-05-08 | End: 2023-05-08

## 2023-05-08 RX ADMIN — IOPAMIDOL 75 ML: 755 INJECTION, SOLUTION INTRAVENOUS at 12:58

## 2023-05-08 RX ADMIN — HYDROCODONE BITARTRATE AND ACETAMINOPHEN 1 TABLET: 5; 325 TABLET ORAL at 16:44

## 2023-05-08 ASSESSMENT — PAIN DESCRIPTION - PAIN TYPE
TYPE: ACUTE PAIN
TYPE: ACUTE PAIN

## 2023-05-08 ASSESSMENT — PAIN DESCRIPTION - ORIENTATION
ORIENTATION: LEFT
ORIENTATION: LEFT

## 2023-05-08 ASSESSMENT — PAIN SCALES - GENERAL
PAINLEVEL_OUTOF10: 5
PAINLEVEL_OUTOF10: 6
PAINLEVEL_OUTOF10: 9

## 2023-05-08 ASSESSMENT — PAIN DESCRIPTION - LOCATION
LOCATION: NECK

## 2023-05-08 ASSESSMENT — PAIN DESCRIPTION - DESCRIPTORS
DESCRIPTORS: PRESSURE;SORE
DESCRIPTORS: SORE

## 2023-05-08 ASSESSMENT — PAIN DESCRIPTION - FREQUENCY: FREQUENCY: INTERMITTENT

## 2023-05-08 ASSESSMENT — PAIN DESCRIPTION - ONSET: ONSET: PROGRESSIVE

## 2023-05-08 ASSESSMENT — PAIN - FUNCTIONAL ASSESSMENT: PAIN_FUNCTIONAL_ASSESSMENT: ACTIVITIES ARE NOT PREVENTED

## 2023-05-08 NOTE — ED TRIAGE NOTES
Swelling to L side of neck. Concerned of abscess- had one drained in the past. Patent airway. No trouble breathing. VSS. Temp 98.9.

## 2023-05-08 NOTE — TELEPHONE ENCOUNTER
pt notified and verbalized understanding of going to the ER.   Pt will be going to [facility] ER

## 2023-05-08 NOTE — ED PROVIDER NOTES
R-3, NormalUse to test sugar once daily. Dx: E11.9      ONE TOUCH ULTRASOFT LANCETS MISC Disp-100 each, R-3, NormalUse to test sugar once daily. Dx: E11.9      ascorbic acid (VITAMIN C) 1000 MG tablet Take 1,000 mg by mouth dailyHistorical Med      vitamin D (CHOLECALCIFEROL) 25 MCG (1000 UT) TABS tablet Take 25 mcg by mouth dailyHistorical Med             ALLERGIES     Penicillins and Metformin    FAMILYHISTORY       Family History   Problem Relation Age of Onset    Diabetes Mother     Stroke Father     Diabetes Sister     Hypertension Sister     Diabetes Brother     Heart Disease Brother         MI    Cancer Brother         Skin        SOCIAL HISTORY       Social History     Tobacco Use    Smoking status: Never    Smokeless tobacco: Never   Vaping Use    Vaping Use: Never used   Substance Use Topics    Alcohol use: Yes     Comment: Occasional    Drug use: Never       SCREENINGS        Temple Coma Scale  Eye Opening: Spontaneous  Best Verbal Response: Oriented  Best Motor Response: Obeys commands  Jordan Coma Scale Score: 15                CIWA Assessment  BP: 131/89  Pulse: 72           PHYSICAL EXAM  1 or more Elements     ED Triage Vitals [05/08/23 1130]   BP Temp Temp Source Pulse Respirations SpO2 Height Weight - Scale   122/78 98.9 °F (37.2 °C) Oral 72 16 95 % 6' 1.5\" (1.867 m) 284 lb 6.3 oz (129 kg)       Physical Exam  Vitals and nursing note reviewed. Constitutional:       General: He is not in acute distress. Appearance: Normal appearance. He is not ill-appearing. HENT:      Head: Normocephalic and atraumatic. Salivary Glands: Left salivary gland is diffusely enlarged and tender. Comments: There is a localized burn mass noted over the left parotid, no erythema or warmth. There is tenderness. No drainage.      Right Ear: Tympanic membrane, ear canal and external ear normal.      Left Ear: Tympanic membrane, ear canal and external ear normal.      Nose: Nose normal.

## 2023-05-08 NOTE — DISCHARGE INSTRUCTIONS
Take antibiotics as prescribed until gone. Suck on hard sour candies or things that might make you salivate to help clear this infection. Follow-up with listed ENT specialist.  Chaz Mealing should also follow-up with physician.

## 2023-05-08 NOTE — TELEPHONE ENCOUNTER
Patient called stating he did not go to the ER or an urgent care. States extreme pain. , that he has been up all night.  Informed patient I would send another message to Dr Cinthya Holland to see if the patient should proceed to there ER or if Dr Cinthya Holland is able to do anything in the office

## 2023-05-12 LAB — BACTERIA BLD CULT: NORMAL

## 2023-05-12 ASSESSMENT — ENCOUNTER SYMPTOMS
SHORTNESS OF BREATH: 0
VOMITING: 0
COUGH: 0
SORE THROAT: 0
CHEST TIGHTNESS: 0
ABDOMINAL PAIN: 0
FACIAL SWELLING: 1
BACK PAIN: 0
VOICE CHANGE: 0
NAUSEA: 0
WHEEZING: 0

## 2023-05-19 RX ORDER — SIMVASTATIN 40 MG
TABLET ORAL
Qty: 90 TABLET | Refills: 0 | Status: SHIPPED | OUTPATIENT
Start: 2023-05-19

## 2023-05-19 NOTE — TELEPHONE ENCOUNTER
Last office visit 3/6/2023     Last written      Next office visit scheduled 7/6/2023    Requested Prescriptions     Pending Prescriptions Disp Refills    simvastatin (ZOCOR) 40 MG tablet [Pharmacy Med Name: SIMVASTATIN 40 MG TABLET] 90 tablet 0     Sig: TAKE ONE TABLET BY MOUTH ONCE NIGHTLY

## 2023-05-22 RX ORDER — EMPAGLIFLOZIN 25 MG/1
TABLET, FILM COATED ORAL
Qty: 90 TABLET | Refills: 0 | Status: SHIPPED | OUTPATIENT
Start: 2023-05-22 | End: 2023-06-25

## 2023-05-22 RX ORDER — SIMVASTATIN 40 MG
TABLET ORAL
Qty: 90 TABLET | Refills: 0 | OUTPATIENT
Start: 2023-05-22

## 2023-05-22 RX ORDER — LEVOTHYROXINE SODIUM 0.05 MG/1
TABLET ORAL
Qty: 90 TABLET | Refills: 0 | Status: SHIPPED | OUTPATIENT
Start: 2023-05-22

## 2023-05-22 RX ORDER — PANTOPRAZOLE SODIUM 20 MG/1
TABLET, DELAYED RELEASE ORAL
Qty: 90 TABLET | Refills: 0 | Status: SHIPPED | OUTPATIENT
Start: 2023-05-22

## 2023-06-25 RX ORDER — EMPAGLIFLOZIN 25 MG/1
TABLET, FILM COATED ORAL
Qty: 30 TABLET | Refills: 0 | Status: SHIPPED | OUTPATIENT
Start: 2023-06-25 | End: 2023-08-18

## 2023-07-05 NOTE — PROGRESS NOTES
Subjective:      Patient ID: Shaji Johnson is a 78 y.o. male. Diabetes  He presents for his follow-up diabetic visit. He has type 2 diabetes mellitus. The initial diagnosis of diabetes was made 10 years ago. His disease course has been stable. Hypoglycemia symptoms include nervousness/anxiousness. Pertinent negatives for diabetes include no chest pain, no foot paresthesias, no foot ulcerations, no polydipsia and no polyuria. There are no hypoglycemic complications. Symptoms are stable. There are no diabetic complications. Risk factors for coronary artery disease include diabetes mellitus, dyslipidemia, family history, male sex and obesity. Current diabetic treatment includes oral agent (triple therapy). He is compliant with treatment all of the time. His weight is stable. He is following a generally unhealthy diet. He participates in exercise intermittently. Home blood sugar record trend: Patient has not been testing his sugars. An ACE inhibitor/angiotensin II receptor blocker is not being taken. Eye exam is current (11-3-22). Hyperlipidemia:  Patient is tolerating and compliant with Simvastatin 40 mg daily. His last lipid check was on 10-26-22 and was at goal for LDL. Hypothyroidism:  Patient is tolerating and compliant with Synthroid 50 mcg daily. His TSH was normal on 10-26-22. Depression / Anxiety: Patient is tolerating and compliant with Celexa 40 mg 1/2 daily. He feels that the medication is helping with his symptoms. He is sleeping well and denies any suicidal ideation. He feels that he still needs to be on the medication. GERD:  Patient is tolerating and compliant with Protonix 20 mg daily. He feels that the medication completely controls his symptoms. Chronic Atrial Fibrillation:  Patient sees Dr. Jeremy Tinajero / Say Manzanares and is S/P pacemaker insertion. He takes Xarelto 20 mg daily and Betapace 120 mg BID. Obesity:  Patient weighed 284 on 23.   He has lost 2 lbs since

## 2023-07-06 ENCOUNTER — OFFICE VISIT (OUTPATIENT)
Dept: FAMILY MEDICINE CLINIC | Age: 78
End: 2023-07-06

## 2023-07-06 VITALS
HEIGHT: 74 IN | WEIGHT: 282.6 LBS | SYSTOLIC BLOOD PRESSURE: 118 MMHG | BODY MASS INDEX: 36.27 KG/M2 | DIASTOLIC BLOOD PRESSURE: 78 MMHG

## 2023-07-06 DIAGNOSIS — Z12.83 SKIN EXAM FOR MALIGNANT NEOPLASM: ICD-10-CM

## 2023-07-06 DIAGNOSIS — K21.9 GERD WITHOUT ESOPHAGITIS: ICD-10-CM

## 2023-07-06 DIAGNOSIS — E03.9 HYPOTHYROIDISM (ACQUIRED): ICD-10-CM

## 2023-07-06 DIAGNOSIS — F32.4 MAJOR DEPRESSION SINGLE EPISODE, IN PARTIAL REMISSION (HCC): ICD-10-CM

## 2023-07-06 DIAGNOSIS — E11.9 TYPE 2 DIABETES MELLITUS WITHOUT COMPLICATION, WITHOUT LONG-TERM CURRENT USE OF INSULIN (HCC): Primary | ICD-10-CM

## 2023-07-06 DIAGNOSIS — I48.20 CHRONIC ATRIAL FIBRILLATION (HCC): ICD-10-CM

## 2023-07-06 DIAGNOSIS — F41.9 ANXIETY: ICD-10-CM

## 2023-07-06 DIAGNOSIS — E66.09 NON MORBID OBESITY DUE TO EXCESS CALORIES: ICD-10-CM

## 2023-07-06 DIAGNOSIS — E78.00 HYPERCHOLESTEROLEMIA: ICD-10-CM

## 2023-07-06 LAB — HBA1C MFR BLD: 7.5 %

## 2023-07-22 RX ORDER — SITAGLIPTIN 100 MG/1
TABLET, FILM COATED ORAL
Qty: 30 TABLET | Refills: 3 | Status: SHIPPED | OUTPATIENT
Start: 2023-07-22

## 2023-08-18 RX ORDER — EMPAGLIFLOZIN 25 MG/1
TABLET, FILM COATED ORAL
Qty: 30 TABLET | Refills: 2 | Status: SHIPPED | OUTPATIENT
Start: 2023-08-18

## 2023-09-05 ENCOUNTER — TELEPHONE (OUTPATIENT)
Dept: FAMILY MEDICINE CLINIC | Age: 78
End: 2023-09-05

## 2023-09-11 RX ORDER — GLIMEPIRIDE 4 MG/1
TABLET ORAL
Qty: 90 TABLET | Refills: 0 | Status: SHIPPED | OUTPATIENT
Start: 2023-09-11 | End: 2023-11-06 | Stop reason: SDUPTHER

## 2023-09-11 RX ORDER — PANTOPRAZOLE SODIUM 20 MG/1
TABLET, DELAYED RELEASE ORAL
Qty: 90 TABLET | Refills: 0 | Status: SHIPPED | OUTPATIENT
Start: 2023-09-11 | End: 2023-11-06 | Stop reason: SDUPTHER

## 2023-09-11 RX ORDER — LEVOTHYROXINE SODIUM 0.05 MG/1
TABLET ORAL
Qty: 90 TABLET | Refills: 0 | Status: SHIPPED | OUTPATIENT
Start: 2023-09-11 | End: 2023-11-06 | Stop reason: SDUPTHER

## 2023-09-11 RX ORDER — SIMVASTATIN 40 MG
TABLET ORAL
Qty: 90 TABLET | Refills: 0 | Status: SHIPPED | OUTPATIENT
Start: 2023-09-11 | End: 2023-11-06 | Stop reason: SDUPTHER

## 2023-11-01 NOTE — PROGRESS NOTES
Subjective:      Patient ID: Ángela Martin is a 70 y.o. male. Diabetes  He presents for his follow-up diabetic visit. He has type 2 diabetes mellitus. The initial diagnosis of diabetes was made 10 years ago. His disease course has been stable. Hypoglycemia symptoms include nervousness/anxiousness. Pertinent negatives for diabetes include no chest pain, no foot paresthesias, no foot ulcerations, no polydipsia and no polyuria. There are no hypoglycemic complications. Symptoms are stable. There are no diabetic complications. Risk factors for coronary artery disease include diabetes mellitus, dyslipidemia, family history, male sex and obesity. Current diabetic treatment includes oral agent (triple therapy). He is compliant with treatment all of the time. His weight is stable. He is following a generally healthy diet. He participates in exercise three times a week. Home blood sugar record trend: Patient has not been testing his sugars. An ACE inhibitor/angiotensin II receptor blocker is not being taken. Eye exam is current (December 2022). Hyperlipidemia:  Patient is tolerating and compliant with Simvastatin 40 mg daily. He is fasting today for a lipid recheck. Hypothyroidism:  Patient is tolerating and compliant with Synthroid 50 mcg daily. He is due for a TSH recheck today. Depression / Anxiety: Patient is tolerating and compliant with Celexa 40 mg 1/2 daily. He feels that the medication is helping with his symptoms. He is sleeping well and denies any suicidal ideation. He feels that he still needs to be on the medication. GERD:  Patient is tolerating and compliant with Protonix 20 mg daily. He feels that the medication completely controls his symptoms. Chronic Atrial Fibrillation:  Patient sees Dr. Mirtha Cleveland / Jarad Barrios and is S/P pacemaker insertion. He takes Xarelto 20 mg daily and Betapace 120 mg BID. Obesity:  Patient weighed 282 on 7-6-23. He has gained 5 lbs since then.

## 2023-11-02 RX ORDER — SITAGLIPTIN 100 MG/1
100 TABLET, FILM COATED ORAL DAILY
Qty: 60 TABLET | Refills: 0 | Status: SHIPPED | OUTPATIENT
Start: 2023-11-02

## 2023-11-02 RX ORDER — EMPAGLIFLOZIN 25 MG/1
TABLET, FILM COATED ORAL
Qty: 30 TABLET | Refills: 0 | Status: SHIPPED | OUTPATIENT
Start: 2023-11-02

## 2023-11-06 ENCOUNTER — OFFICE VISIT (OUTPATIENT)
Dept: FAMILY MEDICINE CLINIC | Age: 78
End: 2023-11-06
Payer: MEDICARE

## 2023-11-06 VITALS
BODY MASS INDEX: 36.91 KG/M2 | WEIGHT: 287.6 LBS | DIASTOLIC BLOOD PRESSURE: 72 MMHG | SYSTOLIC BLOOD PRESSURE: 116 MMHG | HEIGHT: 74 IN

## 2023-11-06 DIAGNOSIS — K21.9 GERD WITHOUT ESOPHAGITIS: ICD-10-CM

## 2023-11-06 DIAGNOSIS — E03.9 HYPOTHYROIDISM (ACQUIRED): ICD-10-CM

## 2023-11-06 DIAGNOSIS — F32.4 MAJOR DEPRESSION SINGLE EPISODE, IN PARTIAL REMISSION (HCC): ICD-10-CM

## 2023-11-06 DIAGNOSIS — E78.00 HYPERCHOLESTEROLEMIA: ICD-10-CM

## 2023-11-06 DIAGNOSIS — E66.09 NON MORBID OBESITY DUE TO EXCESS CALORIES: ICD-10-CM

## 2023-11-06 DIAGNOSIS — F41.9 ANXIETY: ICD-10-CM

## 2023-11-06 DIAGNOSIS — I48.20 CHRONIC ATRIAL FIBRILLATION (HCC): ICD-10-CM

## 2023-11-06 DIAGNOSIS — E11.9 TYPE 2 DIABETES MELLITUS WITHOUT COMPLICATION, WITHOUT LONG-TERM CURRENT USE OF INSULIN (HCC): Primary | ICD-10-CM

## 2023-11-06 DIAGNOSIS — Z23 NEED FOR TDAP VACCINATION: ICD-10-CM

## 2023-11-06 DIAGNOSIS — Z23 NEED FOR INFLUENZA VACCINATION: ICD-10-CM

## 2023-11-06 LAB
ALT SERPL-CCNC: 25 U/L (ref 10–40)
ANION GAP SERPL CALCULATED.3IONS-SCNC: 13 MMOL/L (ref 3–16)
AST SERPL-CCNC: 22 U/L (ref 15–37)
BUN SERPL-MCNC: 11 MG/DL (ref 7–20)
CALCIUM SERPL-MCNC: 10.4 MG/DL (ref 8.3–10.6)
CHLORIDE SERPL-SCNC: 102 MMOL/L (ref 99–110)
CHOLEST SERPL-MCNC: 123 MG/DL (ref 0–199)
CO2 SERPL-SCNC: 25 MMOL/L (ref 21–32)
CREAT SERPL-MCNC: 1 MG/DL (ref 0.8–1.3)
GFR SERPLBLD CREATININE-BSD FMLA CKD-EPI: >60 ML/MIN/{1.73_M2}
GLUCOSE SERPL-MCNC: 162 MG/DL (ref 70–99)
HDLC SERPL-MCNC: 34 MG/DL (ref 40–60)
LDLC SERPL CALC-MCNC: 63 MG/DL
POTASSIUM SERPL-SCNC: 4.9 MMOL/L (ref 3.5–5.1)
SODIUM SERPL-SCNC: 140 MMOL/L (ref 136–145)
TRIGL SERPL-MCNC: 129 MG/DL (ref 0–150)
TSH SERPL DL<=0.005 MIU/L-ACNC: 2.03 UIU/ML (ref 0.27–4.2)
VLDLC SERPL CALC-MCNC: 26 MG/DL

## 2023-11-06 PROCEDURE — 36415 COLL VENOUS BLD VENIPUNCTURE: CPT | Performed by: FAMILY MEDICINE

## 2023-11-06 PROCEDURE — 3051F HG A1C>EQUAL 7.0%<8.0%: CPT | Performed by: FAMILY MEDICINE

## 2023-11-06 PROCEDURE — 1123F ACP DISCUSS/DSCN MKR DOCD: CPT | Performed by: FAMILY MEDICINE

## 2023-11-06 PROCEDURE — 99214 OFFICE O/P EST MOD 30 MIN: CPT | Performed by: FAMILY MEDICINE

## 2023-11-06 RX ORDER — LEVOTHYROXINE SODIUM 0.05 MG/1
TABLET ORAL
Qty: 90 TABLET | Refills: 1 | Status: SHIPPED | OUTPATIENT
Start: 2023-11-06

## 2023-11-06 RX ORDER — SOTALOL HYDROCHLORIDE 120 MG/1
TABLET ORAL
Qty: 180 TABLET | Refills: 1 | Status: SHIPPED | OUTPATIENT
Start: 2023-11-06

## 2023-11-06 RX ORDER — PANTOPRAZOLE SODIUM 20 MG/1
TABLET, DELAYED RELEASE ORAL
Qty: 90 TABLET | Refills: 1 | Status: SHIPPED | OUTPATIENT
Start: 2023-11-06

## 2023-11-06 RX ORDER — CITALOPRAM 40 MG/1
TABLET ORAL
Qty: 45 TABLET | Refills: 1 | Status: SHIPPED | OUTPATIENT
Start: 2023-11-06

## 2023-11-06 RX ORDER — SIMVASTATIN 40 MG
40 TABLET ORAL NIGHTLY
Qty: 90 TABLET | Refills: 1 | Status: SHIPPED | OUTPATIENT
Start: 2023-11-06

## 2023-11-06 RX ORDER — GLIMEPIRIDE 4 MG/1
TABLET ORAL
Qty: 90 TABLET | Refills: 1 | Status: SHIPPED | OUTPATIENT
Start: 2023-11-06

## 2023-11-07 LAB
EST. AVERAGE GLUCOSE BLD GHB EST-MCNC: 177.2 MG/DL
HBA1C MFR BLD: 7.8 %

## 2023-11-29 ENCOUNTER — TELEPHONE (OUTPATIENT)
Dept: FAMILY MEDICINE CLINIC | Age: 78
End: 2023-11-29

## 2023-11-29 RX ORDER — METOPROLOL SUCCINATE 50 MG/1
50 TABLET, EXTENDED RELEASE ORAL DAILY
COMMUNITY
Start: 2023-11-13

## 2023-11-29 NOTE — TELEPHONE ENCOUNTER
Pt called to inform Dr. Joellen Julian that he received his new pace maker yesterday, and had it put in at [facility]. He also saw his cardiologist, and he took pt off of sotalol and put him on metoprolol 50 MG

## 2024-02-14 ENCOUNTER — TELEMEDICINE (OUTPATIENT)
Dept: FAMILY MEDICINE CLINIC | Age: 79
End: 2024-02-14
Payer: MEDICARE

## 2024-02-14 DIAGNOSIS — B35.4 TINEA CORPORIS: Primary | ICD-10-CM

## 2024-02-14 PROCEDURE — 1123F ACP DISCUSS/DSCN MKR DOCD: CPT | Performed by: FAMILY MEDICINE

## 2024-02-14 PROCEDURE — 99213 OFFICE O/P EST LOW 20 MIN: CPT | Performed by: FAMILY MEDICINE

## 2024-02-14 RX ORDER — NYSTATIN 100000 U/G
CREAM TOPICAL
Qty: 15 G | Refills: 1 | Status: SHIPPED | OUTPATIENT
Start: 2024-02-14

## 2024-02-14 NOTE — PROGRESS NOTES
Subjective:      Patient ID: Rosales Bui is a 78 y.o. male.    HPI  Rosales Bui, was evaluated through a synchronous (real-time) audio-video encounter. The patient (or guardian if applicable) is aware that this is a billable service, which includes applicable co-pays. This Virtual Visit was conducted with patient's (and/or legal guardian's) consent. Patient identification was verified, and a caregiver was present when appropriate.   The patient was located at Home: [address] [place] [address] [address]  [facility] [address]  Provider was located at Facility (Appt Dept): [address] [facility]  [address]  [place] [address]       Total time spent for this encounter: Not billed by time    --VIPIN LANDIN DO on 2/14/2024 at 2:01 PM    An electronic signature was used to authenticate this note.     Rash on Abdomen:  Patient hs a 4 inch area of rash in the fold of the lower abdomen that has been present for 1 week and staying the same.  He has been using an OTC diaper cream without improvement.      Review of Systems   Constitutional:  Negative for chills and fever.   Skin:  Positive for rash.     There were no vitals taken for this visit.   Objective:   Physical Exam  Constitutional:       General: He is not in acute distress.     Appearance: Normal appearance. He is obese. He is not ill-appearing or toxic-appearing.   HENT:      Head: Normocephalic.   Pulmonary:      Effort: Pulmonary effort is normal.   Skin:     Comments: There is erythema of the left lower abdomen fold over the pelvis consistent with tinea.    Neurological:      Mental Status: He is alert and oriented to person, place, and time.   Psychiatric:         Mood and Affect: Mood normal.         Behavior: Behavior normal.         Thought Content: Thought content normal.         Judgment: Judgment normal.         Assessment:      Tinea Corporis       Plan:      Rx Nystatin cream applied BID  I recommended a Tdap shot at the pharmacy.   Recommended that patient

## 2024-03-04 NOTE — PROGRESS NOTES
Subjective:      Patient ID: Rosales Bui is a 78 y.o. male.    Diabetes  He presents for his follow-up diabetic visit. He has type 2 diabetes mellitus. The initial diagnosis of diabetes was made 10 years ago. His disease course has been stable. Hypoglycemia symptoms include nervousness/anxiousness. Pertinent negatives for diabetes include no chest pain, no foot paresthesias, no foot ulcerations, no polydipsia and no polyuria. There are no hypoglycemic complications. Symptoms are stable. There are no diabetic complications. Risk factors for coronary artery disease include diabetes mellitus, dyslipidemia, family history, male sex and obesity. Current diabetic treatment includes oral agent (triple therapy). He is compliant with treatment all of the time. His weight is increasing steadily. He is following a generally healthy diet. He participates in exercise three times a week. Home blood sugar record trend: Patient has not been testing his sugars. An ACE inhibitor/angiotensin II receptor blocker is not being taken. Eye exam is not current.      Hyperlipidemia:  Patient is tolerating and compliant with Simvastatin 40 mg daily.  His last lipid check was on 11-6-23 and was at goal for LDL.      Hypothyroidism:  Patient is tolerating and compliant with Synthroid 50 mcg daily.  His TSH was normal on 11-6-23.       Depression / Anxiety: Patient is tolerating and compliant with Celexa 40 mg 1/2 daily.  He feels that the medication is helping with his symptoms.  He is sleeping well and denies any suicidal ideation.  He feels that he still needs to be on the medication.       GERD:  Patient is tolerating and compliant with Protonix 20 mg daily.  He feels that the medication completely controls his symptoms.      Chronic Atrial Fibrillation:  Patient sees Dr. Fuentes / [place] and is S/P pacemaker insertion.  He takes Xarelto 20 mg daily and Toprol XL 50 mg daily.      Obesity:  Patient weighed 287 on 11-6-23.  He has gained 7

## 2024-03-05 ENCOUNTER — OFFICE VISIT (OUTPATIENT)
Dept: FAMILY MEDICINE CLINIC | Age: 79
End: 2024-03-05
Payer: MEDICARE

## 2024-03-05 VITALS
BODY MASS INDEX: 37.73 KG/M2 | SYSTOLIC BLOOD PRESSURE: 128 MMHG | HEIGHT: 74 IN | WEIGHT: 294 LBS | DIASTOLIC BLOOD PRESSURE: 80 MMHG

## 2024-03-05 DIAGNOSIS — F41.9 ANXIETY: ICD-10-CM

## 2024-03-05 DIAGNOSIS — K21.9 GERD WITHOUT ESOPHAGITIS: ICD-10-CM

## 2024-03-05 DIAGNOSIS — E78.00 HYPERCHOLESTEROLEMIA: ICD-10-CM

## 2024-03-05 DIAGNOSIS — F32.4 MAJOR DEPRESSION SINGLE EPISODE, IN PARTIAL REMISSION (HCC): ICD-10-CM

## 2024-03-05 DIAGNOSIS — I48.20 CHRONIC ATRIAL FIBRILLATION (HCC): ICD-10-CM

## 2024-03-05 DIAGNOSIS — E03.9 HYPOTHYROIDISM (ACQUIRED): ICD-10-CM

## 2024-03-05 DIAGNOSIS — Z23 NEED FOR TDAP VACCINATION: ICD-10-CM

## 2024-03-05 DIAGNOSIS — E11.9 TYPE 2 DIABETES MELLITUS WITHOUT COMPLICATION, WITHOUT LONG-TERM CURRENT USE OF INSULIN (HCC): Primary | ICD-10-CM

## 2024-03-05 DIAGNOSIS — E66.09 NON MORBID OBESITY DUE TO EXCESS CALORIES: ICD-10-CM

## 2024-03-05 LAB — HBA1C MFR BLD: 8 %

## 2024-03-05 PROCEDURE — 3052F HG A1C>EQUAL 8.0%<EQUAL 9.0%: CPT | Performed by: FAMILY MEDICINE

## 2024-03-05 PROCEDURE — 99214 OFFICE O/P EST MOD 30 MIN: CPT | Performed by: FAMILY MEDICINE

## 2024-03-05 PROCEDURE — 1123F ACP DISCUSS/DSCN MKR DOCD: CPT | Performed by: FAMILY MEDICINE

## 2024-03-05 PROCEDURE — 83036 HEMOGLOBIN GLYCOSYLATED A1C: CPT | Performed by: FAMILY MEDICINE

## 2024-03-05 RX ORDER — PEN NEEDLE, DIABETIC 30 GX5/16"
NEEDLE, DISPOSABLE MISCELLANEOUS
Qty: 100 EACH | Refills: 3 | Status: SHIPPED | OUTPATIENT
Start: 2024-03-05

## 2024-03-05 RX ORDER — INSULIN GLARGINE 100 [IU]/ML
15 INJECTION, SOLUTION SUBCUTANEOUS NIGHTLY
Qty: 5 ADJUSTABLE DOSE PRE-FILLED PEN SYRINGE | Refills: 3 | Status: SHIPPED | OUTPATIENT
Start: 2024-03-05

## 2024-03-08 ENCOUNTER — TELEPHONE (OUTPATIENT)
Dept: FAMILY MEDICINE CLINIC | Age: 79
End: 2024-03-08

## 2024-03-08 NOTE — TELEPHONE ENCOUNTER
Pt called requesting a glucose monitor Kit, strips, and lancets sent to [facility].      Also, pt stated that he was not told that the Lantus needed to be kept in fridge.    He stated that he left his out for 12 hours before realizing.  Pharmacy said one pen is fine the rest are no good. Pt stated his daughter-in-law is a pharmacist and said they should be ok.      I informed pt that I will call the  on Monday, to make sure they are safe to use.

## 2024-03-10 RX ORDER — BLOOD-GLUCOSE METER
EACH MISCELLANEOUS
Qty: 1 KIT | Refills: 0 | Status: SHIPPED | OUTPATIENT
Start: 2024-03-10

## 2024-03-10 RX ORDER — LANCETS
EACH MISCELLANEOUS
Qty: 100 EACH | Refills: 3 | Status: SHIPPED | OUTPATIENT
Start: 2024-03-10

## 2024-03-11 NOTE — TELEPHONE ENCOUNTER
Pt informed. Pt is getting errors on the test strips, advised pt to contact pharmacy to see if strips are bad.

## 2024-06-03 RX ORDER — CITALOPRAM 40 MG/1
TABLET ORAL
Qty: 45 TABLET | Refills: 0 | Status: SHIPPED | OUTPATIENT
Start: 2024-06-03

## 2024-06-03 NOTE — TELEPHONE ENCOUNTER
Last office visit 3/5/2024       Next office visit scheduled 7/15/2024    Requested Prescriptions     Pending Prescriptions Disp Refills    citalopram (CELEXA) 40 MG tablet [Pharmacy Med Name: CITALOPRAM HBR 40 MG TABLET] 45 tablet 1     Sig: TAKE 1/2 TABLET BY MOUTH DAILY

## 2024-07-03 NOTE — PROGRESS NOTES
Subjective:      Patient ID: Rosales Bui is a 78 y.o. male.    Diabetes  He presents for his follow-up diabetic visit. He has type 2 diabetes mellitus. The initial diagnosis of diabetes was made 10 years ago. His disease course has been stable. Hypoglycemia symptoms include nervousness/anxiousness. Pertinent negatives for diabetes include no chest pain, no foot paresthesias, no foot ulcerations, no polydipsia and no polyuria. There are no hypoglycemic complications. Symptoms are stable. There are no diabetic complications. Risk factors for coronary artery disease include diabetes mellitus, dyslipidemia, family history, male sex and obesity. Current diabetic treatment includes insulin injections and oral agent (dual therapy). He is compliant with treatment all of the time. His weight is decreasing steadily. He is following a generally healthy diet. He participates in exercise three times a week. There is no change in his home blood glucose trend. His overall blood glucose range is 110-130 mg/dl. An ACE inhibitor/angiotensin II receptor blocker is not being taken. Eye exam is current (April 2024).      Hyperlipidemia:  Patient is tolerating and compliant with Simvastatin 40 mg daily.  His last lipid check was on 11-6-23 and was at goal for LDL.      Hypothyroidism:  Patient is tolerating and compliant with Synthroid 50 mcg daily.  His TSH was normal on 11-6-23.       Depression / Anxiety: Patient is tolerating and compliant with Celexa 40 mg 1/2 daily.  He feels that the medication is helping with his symptoms.  He is sleeping well and denies any suicidal ideation.  He feels that he still needs to be on the medication.       GERD:  Patient is tolerating and compliant with Protonix 20 mg daily.  He feels that the medication completely controls his symptoms.      Chronic Atrial Fibrillation:  Patient sees Dr. Fuentes / [facility] and is S/P pacemaker insertion.  He takes Xarelto 20 mg daily and Toprol XL 50 mg daily.

## 2024-07-14 RX ORDER — LEVOTHYROXINE SODIUM 0.05 MG/1
TABLET ORAL
Qty: 90 TABLET | Refills: 0 | Status: SHIPPED | OUTPATIENT
Start: 2024-07-14

## 2024-07-15 ENCOUNTER — OFFICE VISIT (OUTPATIENT)
Dept: FAMILY MEDICINE CLINIC | Age: 79
End: 2024-07-15
Payer: MEDICARE

## 2024-07-15 VITALS
SYSTOLIC BLOOD PRESSURE: 110 MMHG | HEIGHT: 74 IN | BODY MASS INDEX: 35.55 KG/M2 | DIASTOLIC BLOOD PRESSURE: 68 MMHG | WEIGHT: 277 LBS

## 2024-07-15 DIAGNOSIS — E78.00 HYPERCHOLESTEROLEMIA: ICD-10-CM

## 2024-07-15 DIAGNOSIS — I48.20 CHRONIC ATRIAL FIBRILLATION (HCC): ICD-10-CM

## 2024-07-15 DIAGNOSIS — E66.09 NON MORBID OBESITY DUE TO EXCESS CALORIES: ICD-10-CM

## 2024-07-15 DIAGNOSIS — K21.9 GERD WITHOUT ESOPHAGITIS: ICD-10-CM

## 2024-07-15 DIAGNOSIS — E11.9 TYPE 2 DIABETES MELLITUS WITHOUT COMPLICATION, WITHOUT LONG-TERM CURRENT USE OF INSULIN (HCC): Primary | ICD-10-CM

## 2024-07-15 DIAGNOSIS — E03.9 HYPOTHYROIDISM (ACQUIRED): ICD-10-CM

## 2024-07-15 DIAGNOSIS — F41.9 ANXIETY: ICD-10-CM

## 2024-07-15 DIAGNOSIS — F32.4 MAJOR DEPRESSION SINGLE EPISODE, IN PARTIAL REMISSION (HCC): ICD-10-CM

## 2024-07-15 LAB — HBA1C MFR BLD: 6.9 %

## 2024-07-15 PROCEDURE — 1123F ACP DISCUSS/DSCN MKR DOCD: CPT | Performed by: FAMILY MEDICINE

## 2024-07-15 PROCEDURE — 3044F HG A1C LEVEL LT 7.0%: CPT | Performed by: FAMILY MEDICINE

## 2024-07-15 PROCEDURE — 99214 OFFICE O/P EST MOD 30 MIN: CPT | Performed by: FAMILY MEDICINE

## 2024-07-15 PROCEDURE — 83036 HEMOGLOBIN GLYCOSYLATED A1C: CPT | Performed by: FAMILY MEDICINE

## 2024-07-15 RX ORDER — SIMVASTATIN 40 MG
40 TABLET ORAL NIGHTLY
Qty: 30 TABLET | Refills: 3 | Status: SHIPPED | OUTPATIENT
Start: 2024-07-15

## 2024-07-15 RX ORDER — CITALOPRAM 40 MG/1
TABLET ORAL
Qty: 30 TABLET | Refills: 3 | Status: SHIPPED | OUTPATIENT
Start: 2024-07-15

## 2024-07-15 RX ORDER — PANTOPRAZOLE SODIUM 20 MG/1
TABLET, DELAYED RELEASE ORAL
Qty: 30 TABLET | Refills: 3 | Status: SHIPPED | OUTPATIENT
Start: 2024-07-15

## 2024-07-15 RX ORDER — INSULIN GLARGINE 100 [IU]/ML
15 INJECTION, SOLUTION SUBCUTANEOUS NIGHTLY
Qty: 5 ADJUSTABLE DOSE PRE-FILLED PEN SYRINGE | Refills: 3 | Status: SHIPPED | OUTPATIENT
Start: 2024-07-15

## 2024-07-15 RX ORDER — LEVOTHYROXINE SODIUM 0.05 MG/1
50 TABLET ORAL DAILY
Qty: 30 TABLET | Refills: 3 | Status: SHIPPED | OUTPATIENT
Start: 2024-07-15

## 2024-07-15 SDOH — ECONOMIC STABILITY: HOUSING INSECURITY
IN THE LAST 12 MONTHS, WAS THERE A TIME WHEN YOU DID NOT HAVE A STEADY PLACE TO SLEEP OR SLEPT IN A SHELTER (INCLUDING NOW)?: NO

## 2024-07-15 SDOH — ECONOMIC STABILITY: INCOME INSECURITY: HOW HARD IS IT FOR YOU TO PAY FOR THE VERY BASICS LIKE FOOD, HOUSING, MEDICAL CARE, AND HEATING?: NOT VERY HARD

## 2024-07-15 SDOH — ECONOMIC STABILITY: FOOD INSECURITY: WITHIN THE PAST 12 MONTHS, YOU WORRIED THAT YOUR FOOD WOULD RUN OUT BEFORE YOU GOT MONEY TO BUY MORE.: NEVER TRUE

## 2024-07-15 SDOH — ECONOMIC STABILITY: FOOD INSECURITY: WITHIN THE PAST 12 MONTHS, THE FOOD YOU BOUGHT JUST DIDN'T LAST AND YOU DIDN'T HAVE MONEY TO GET MORE.: NEVER TRUE

## 2024-10-23 NOTE — PROGRESS NOTES
Subjective:      Patient ID: oRsales Bui is a 79 y.o. male.    Diabetes  He presents for his follow-up diabetic visit. He has type 2 diabetes mellitus. The initial diagnosis of diabetes was made 10 years ago. His disease course has been stable. Hypoglycemia symptoms include nervousness/anxiousness. Pertinent negatives for diabetes include no chest pain, no foot paresthesias, no foot ulcerations, no polydipsia and no polyuria. There are no hypoglycemic complications. Symptoms are stable. There are no diabetic complications. Risk factors for coronary artery disease include diabetes mellitus, dyslipidemia, family history, male sex and obesity. Current diabetic treatment includes insulin injections and oral agent (dual therapy). He is compliant with treatment all of the time. His weight is decreasing steadily. He is following a generally healthy diet. He participates in exercise intermittently. There is no change in his home blood glucose trend. His overall blood glucose range is 110-130 mg/dl. An ACE inhibitor/angiotensin II receptor blocker is not being taken. Eye exam is current (April 2024).      Hyperlipidemia:  Patient is tolerating and compliant with Simvastatin 40 mg daily.  He is non-fasting today for a lipid recheck.       Hypothyroidism:  Patient is tolerating and compliant with Synthroid 50 mcg daily.  He is due for a TSH recheck today.       Depression / Anxiety: Patient is tolerating and compliant with Celexa 40 mg 1/2 daily.  He feels that the medication is helping with his symptoms.  He is sleeping well and denies any suicidal ideation.  He feels that he still needs to be on the medication.       GERD:  Patient is tolerating and compliant with Protonix 20 mg daily.  He feels that the medication completely controls his symptoms.      Chronic Atrial Fibrillation:  Patient sees Dr. Fuentes / [facility] and is S/P pacemaker insertion.  He takes Xarelto 20 mg daily and Toprol XL 50 mg daily.      Obesity:

## 2024-10-24 ENCOUNTER — OFFICE VISIT (OUTPATIENT)
Dept: FAMILY MEDICINE CLINIC | Age: 79
End: 2024-10-24

## 2024-10-24 VITALS
SYSTOLIC BLOOD PRESSURE: 122 MMHG | BODY MASS INDEX: 36.45 KG/M2 | DIASTOLIC BLOOD PRESSURE: 80 MMHG | WEIGHT: 275 LBS | HEIGHT: 73 IN

## 2024-10-24 DIAGNOSIS — I48.20 CHRONIC ATRIAL FIBRILLATION (HCC): ICD-10-CM

## 2024-10-24 DIAGNOSIS — E66.09 NON MORBID OBESITY DUE TO EXCESS CALORIES: ICD-10-CM

## 2024-10-24 DIAGNOSIS — E11.9 TYPE 2 DIABETES MELLITUS WITHOUT COMPLICATION, WITHOUT LONG-TERM CURRENT USE OF INSULIN (HCC): Primary | ICD-10-CM

## 2024-10-24 DIAGNOSIS — E78.00 HYPERCHOLESTEROLEMIA: ICD-10-CM

## 2024-10-24 DIAGNOSIS — Z23 NEED FOR INFLUENZA VACCINATION: ICD-10-CM

## 2024-10-24 DIAGNOSIS — F32.4 MAJOR DEPRESSION SINGLE EPISODE, IN PARTIAL REMISSION (HCC): ICD-10-CM

## 2024-10-24 DIAGNOSIS — F41.9 ANXIETY: ICD-10-CM

## 2024-10-24 DIAGNOSIS — J01.10 ACUTE NON-RECURRENT FRONTAL SINUSITIS: ICD-10-CM

## 2024-10-24 DIAGNOSIS — E03.9 HYPOTHYROIDISM (ACQUIRED): ICD-10-CM

## 2024-10-24 DIAGNOSIS — K21.9 GERD WITHOUT ESOPHAGITIS: ICD-10-CM

## 2024-10-24 LAB
ALT SERPL-CCNC: 23 U/L (ref 10–40)
ANION GAP SERPL CALCULATED.3IONS-SCNC: 11 MMOL/L (ref 3–16)
AST SERPL-CCNC: 24 U/L (ref 15–37)
BUN SERPL-MCNC: 14 MG/DL (ref 7–20)
CALCIUM SERPL-MCNC: 10.3 MG/DL (ref 8.3–10.6)
CHLORIDE SERPL-SCNC: 99 MMOL/L (ref 99–110)
CHOLEST SERPL-MCNC: 127 MG/DL (ref 0–199)
CO2 SERPL-SCNC: 27 MMOL/L (ref 21–32)
CREAT SERPL-MCNC: 1 MG/DL (ref 0.8–1.3)
EST. AVERAGE GLUCOSE BLD GHB EST-MCNC: 165.7 MG/DL
GFR SERPLBLD CREATININE-BSD FMLA CKD-EPI: 77 ML/MIN/{1.73_M2}
GLUCOSE SERPL-MCNC: 169 MG/DL (ref 70–99)
HBA1C MFR BLD: 7.4 %
HDLC SERPL-MCNC: 31 MG/DL (ref 40–60)
LDLC SERPL CALC-MCNC: 75 MG/DL
POTASSIUM SERPL-SCNC: 4.6 MMOL/L (ref 3.5–5.1)
SODIUM SERPL-SCNC: 137 MMOL/L (ref 136–145)
TRIGL SERPL-MCNC: 105 MG/DL (ref 0–150)
TSH SERPL DL<=0.005 MIU/L-ACNC: 0.8 UIU/ML (ref 0.27–4.2)
VLDLC SERPL CALC-MCNC: 21 MG/DL

## 2024-10-24 RX ORDER — DOXYCYCLINE HYCLATE 100 MG
100 TABLET ORAL 2 TIMES DAILY
Qty: 20 TABLET | Refills: 0 | Status: SHIPPED | OUTPATIENT
Start: 2024-10-24 | End: 2024-11-03

## 2024-10-24 ASSESSMENT — ENCOUNTER SYMPTOMS
WHEEZING: 0
COUGH: 1
RHINORRHEA: 1
SINUS PRESSURE: 1
SORE THROAT: 1
SHORTNESS OF BREATH: 0
SINUS PAIN: 1

## 2024-10-25 RX ORDER — INSULIN GLARGINE 100 [IU]/ML
20 INJECTION, SOLUTION SUBCUTANEOUS NIGHTLY
COMMUNITY

## 2024-11-08 ENCOUNTER — TELEPHONE (OUTPATIENT)
Dept: FAMILY MEDICINE CLINIC | Age: 79
End: 2024-11-08

## 2024-11-24 RX ORDER — SITAGLIPTIN 100 MG/1
100 TABLET, FILM COATED ORAL DAILY
Qty: 90 TABLET | Refills: 0 | Status: SHIPPED | OUTPATIENT
Start: 2024-11-24

## 2024-12-22 RX ORDER — EMPAGLIFLOZIN 25 MG/1
25 TABLET, FILM COATED ORAL DAILY
Qty: 60 TABLET | Refills: 0 | Status: SHIPPED | OUTPATIENT
Start: 2024-12-22

## 2025-01-31 NOTE — TELEPHONE ENCOUNTER
Last office visit 10/24/2024       Next office visit scheduled 2/25/2025    Requested Prescriptions     Pending Prescriptions Disp Refills    XARELTO 20 MG TABS tablet [Pharmacy Med Name: XARELTO 20 MG TABLET] 30 tablet 3     Sig: TAKE 1 TABLET BY MOUTH DAILY   \

## 2025-02-07 ENCOUNTER — TELEPHONE (OUTPATIENT)
Dept: FAMILY MEDICINE CLINIC | Age: 80
End: 2025-02-07

## 2025-02-07 NOTE — TELEPHONE ENCOUNTER
Pt called to let Dr Sousa know he will not be taking Jardiance. He went to pick it up and the charge was $680 for a 90 day supply. The 30 day supply was $350. He still have some Januvia. The Xarelto went from from $44 to $145. He need a new rx for the Januvia sent to [facility] [phone]. Please give pt a call [phone].

## 2025-02-07 NOTE — TELEPHONE ENCOUNTER
Spoke with pharmacist and she stated all of the SGLT-2 medications are going to be about that much. Patient notified via phone call

## 2025-02-07 NOTE — TELEPHONE ENCOUNTER
I have sent the Rx for Januvia.  The Jardiance is an SGLT-2 medication.  Please have patient find out from his pharmacist if there is another SGLT-2 that would be more affordable with his insurance.

## 2025-02-07 NOTE — TELEPHONE ENCOUNTER
If he has to stop taking the Jardiance, have him increase his Lantus insulin to 30 units nightly.

## 2025-02-14 RX ORDER — BLOOD SUGAR DIAGNOSTIC
STRIP MISCELLANEOUS
Qty: 100 STRIP | Refills: 3 | Status: SHIPPED | OUTPATIENT
Start: 2025-02-14

## 2025-02-14 NOTE — TELEPHONE ENCOUNTER
Last office visit 10/24/2024       Next office visit scheduled 2/25/2025    Requested Prescriptions     Pending Prescriptions Disp Refills    ONETOUCH ULTRA strip [Pharmacy Med Name: ONETOUCH ULTRA TEST STRIP] 100 strip      Sig: USE 1 STRIP TO TEST DAILY

## 2025-02-24 ASSESSMENT — ENCOUNTER SYMPTOMS: SHORTNESS OF BREATH: 0

## 2025-02-24 NOTE — PROGRESS NOTES
Subjective:      Patient ID: Rosales Bui is a 79 y.o. male.    Diabetes  He presents for his follow-up diabetic visit. He has type 2 diabetes mellitus. The initial diagnosis of diabetes was made 10 years ago. His disease course has been stable. Hypoglycemia symptoms include nervousness/anxiousness. Pertinent negatives for diabetes include no chest pain, no foot paresthesias, no foot ulcerations, no polydipsia and no polyuria. There are no hypoglycemic complications. Symptoms are stable. There are no diabetic complications. Risk factors for coronary artery disease include diabetes mellitus, dyslipidemia, family history, male sex and obesity. Current diabetic treatment includes insulin injections and oral agent (monotherapy). He is compliant with treatment all of the time. His weight is stable. He is following a generally healthy diet. He participates in exercise intermittently. There is no change in his home blood glucose trend. His overall blood glucose range is 110-130 mg/dl. An ACE inhibitor/angiotensin II receptor blocker is not being taken. Eye exam is current (April 2024).      Hyperlipidemia:  Patient is tolerating and compliant with Simvastatin 40 mg daily.  His last lipid check was on 10-24-24 and was at goal for LDL.       Hypothyroidism:  Patient is tolerating and compliant with Synthroid 50 mcg daily.  His TSH was normal on 10-24-24.      Depression / Anxiety: Patient is tolerating and compliant with Celexa 40 mg 1/2 daily.  He feels that the medication is helping with his symptoms.  He is sleeping well and denies any suicidal ideation.  He feels that he still needs to be on the medication.       GERD:  Patient is tolerating and compliant with Protonix 20 mg daily.  He feels that the medication completely controls his symptoms.      Chronic Atrial Fibrillation:  Patient sees Dr. Fuentes / [facility] and is S/P pacemaker insertion.  He takes Xarelto 20 mg daily and Toprol XL 50 mg daily.      Obesity:

## 2025-02-25 ENCOUNTER — OFFICE VISIT (OUTPATIENT)
Dept: FAMILY MEDICINE CLINIC | Age: 80
End: 2025-02-25

## 2025-02-25 VITALS
HEIGHT: 73 IN | WEIGHT: 276 LBS | BODY MASS INDEX: 36.58 KG/M2 | DIASTOLIC BLOOD PRESSURE: 80 MMHG | SYSTOLIC BLOOD PRESSURE: 132 MMHG

## 2025-02-25 DIAGNOSIS — F32.4 MAJOR DEPRESSION SINGLE EPISODE, IN PARTIAL REMISSION: ICD-10-CM

## 2025-02-25 DIAGNOSIS — I48.20 CHRONIC ATRIAL FIBRILLATION (HCC): ICD-10-CM

## 2025-02-25 DIAGNOSIS — E78.00 HYPERCHOLESTEROLEMIA: ICD-10-CM

## 2025-02-25 DIAGNOSIS — E03.9 HYPOTHYROIDISM (ACQUIRED): ICD-10-CM

## 2025-02-25 DIAGNOSIS — F41.9 ANXIETY: ICD-10-CM

## 2025-02-25 DIAGNOSIS — K21.9 GERD WITHOUT ESOPHAGITIS: ICD-10-CM

## 2025-02-25 DIAGNOSIS — E66.09 NON MORBID OBESITY DUE TO EXCESS CALORIES: ICD-10-CM

## 2025-02-25 DIAGNOSIS — E11.9 TYPE 2 DIABETES MELLITUS WITHOUT COMPLICATION, WITHOUT LONG-TERM CURRENT USE OF INSULIN (HCC): Primary | ICD-10-CM

## 2025-02-25 LAB — HBA1C MFR BLD: 6.9 %

## 2025-02-25 SDOH — ECONOMIC STABILITY: FOOD INSECURITY: WITHIN THE PAST 12 MONTHS, THE FOOD YOU BOUGHT JUST DIDN'T LAST AND YOU DIDN'T HAVE MONEY TO GET MORE.: NEVER TRUE

## 2025-02-25 SDOH — ECONOMIC STABILITY: FOOD INSECURITY: WITHIN THE PAST 12 MONTHS, YOU WORRIED THAT YOUR FOOD WOULD RUN OUT BEFORE YOU GOT MONEY TO BUY MORE.: NEVER TRUE

## 2025-02-25 ASSESSMENT — PATIENT HEALTH QUESTIONNAIRE - PHQ9
SUM OF ALL RESPONSES TO PHQ QUESTIONS 1-9: 0
5. POOR APPETITE OR OVEREATING: NOT AT ALL
SUM OF ALL RESPONSES TO PHQ9 QUESTIONS 1 & 2: 0
8. MOVING OR SPEAKING SO SLOWLY THAT OTHER PEOPLE COULD HAVE NOTICED. OR THE OPPOSITE, BEING SO FIGETY OR RESTLESS THAT YOU HAVE BEEN MOVING AROUND A LOT MORE THAN USUAL: NOT AT ALL
10. IF YOU CHECKED OFF ANY PROBLEMS, HOW DIFFICULT HAVE THESE PROBLEMS MADE IT FOR YOU TO DO YOUR WORK, TAKE CARE OF THINGS AT HOME, OR GET ALONG WITH OTHER PEOPLE: NOT DIFFICULT AT ALL
2. FEELING DOWN, DEPRESSED OR HOPELESS: NOT AT ALL
6. FEELING BAD ABOUT YOURSELF - OR THAT YOU ARE A FAILURE OR HAVE LET YOURSELF OR YOUR FAMILY DOWN: NOT AT ALL
7. TROUBLE CONCENTRATING ON THINGS, SUCH AS READING THE NEWSPAPER OR WATCHING TELEVISION: NOT AT ALL
9. THOUGHTS THAT YOU WOULD BE BETTER OFF DEAD, OR OF HURTING YOURSELF: NOT AT ALL
3. TROUBLE FALLING OR STAYING ASLEEP: NOT AT ALL
1. LITTLE INTEREST OR PLEASURE IN DOING THINGS: NOT AT ALL
SUM OF ALL RESPONSES TO PHQ QUESTIONS 1-9: 0
4. FEELING TIRED OR HAVING LITTLE ENERGY: NOT AT ALL

## 2025-03-05 RX ORDER — PANTOPRAZOLE SODIUM 20 MG/1
TABLET, DELAYED RELEASE ORAL
Qty: 90 TABLET | Refills: 1 | Status: SHIPPED | OUTPATIENT
Start: 2025-03-05

## 2025-03-17 ENCOUNTER — TELEPHONE (OUTPATIENT)
Dept: FAMILY MEDICINE CLINIC | Age: 80
End: 2025-03-17

## 2025-03-17 NOTE — TELEPHONE ENCOUNTER
Pt need a new rx for his Lantus Insulin 30 units sent to [facility] [phone] for April. He states they only have a rx for the 15 units. Dr Sousa increase the dosage a while ago.  If any questions give pt a call [phone]

## 2025-03-18 RX ORDER — INSULIN GLARGINE 100 [IU]/ML
30 INJECTION, SOLUTION SUBCUTANEOUS NIGHTLY
Qty: 5 ADJUSTABLE DOSE PRE-FILLED PEN SYRINGE | Refills: 2 | Status: SHIPPED | OUTPATIENT
Start: 2025-03-18

## 2025-04-01 RX ORDER — PEN NEEDLE, DIABETIC 31 GX3/16"
NEEDLE, DISPOSABLE MISCELLANEOUS
Qty: 100 EACH | Refills: 3 | Status: SHIPPED | OUTPATIENT
Start: 2025-04-01

## 2025-04-01 NOTE — TELEPHONE ENCOUNTER
Last office visit 2/25/2025       Next office visit scheduled 6/26/2025    Requested Prescriptions     Pending Prescriptions Disp Refills    Insulin Pen Needle ([employer] PEN NEEDLES) 31G X 5 MM MISC [Pharmacy Med Name: KRO PEN NEEDLE 5MM X 31G] 100 each 3     Sig: USE TO INJECT INSULIN ONCE NIGHTLY

## 2025-04-16 ENCOUNTER — TELEMEDICINE (OUTPATIENT)
Dept: FAMILY MEDICINE CLINIC | Age: 80
End: 2025-04-16

## 2025-04-16 DIAGNOSIS — Z00.00 MEDICARE ANNUAL WELLNESS VISIT, SUBSEQUENT: Primary | ICD-10-CM

## 2025-04-16 ASSESSMENT — PATIENT HEALTH QUESTIONNAIRE - PHQ9
6. FEELING BAD ABOUT YOURSELF - OR THAT YOU ARE A FAILURE OR HAVE LET YOURSELF OR YOUR FAMILY DOWN: NOT AT ALL
8. MOVING OR SPEAKING SO SLOWLY THAT OTHER PEOPLE COULD HAVE NOTICED. OR THE OPPOSITE, BEING SO FIGETY OR RESTLESS THAT YOU HAVE BEEN MOVING AROUND A LOT MORE THAN USUAL: NOT AT ALL
SUM OF ALL RESPONSES TO PHQ QUESTIONS 1-9: 0
SUM OF ALL RESPONSES TO PHQ QUESTIONS 1-9: 0
3. TROUBLE FALLING OR STAYING ASLEEP: NOT AT ALL
4. FEELING TIRED OR HAVING LITTLE ENERGY: NOT AT ALL
9. THOUGHTS THAT YOU WOULD BE BETTER OFF DEAD, OR OF HURTING YOURSELF: NOT AT ALL
10. IF YOU CHECKED OFF ANY PROBLEMS, HOW DIFFICULT HAVE THESE PROBLEMS MADE IT FOR YOU TO DO YOUR WORK, TAKE CARE OF THINGS AT HOME, OR GET ALONG WITH OTHER PEOPLE: NOT DIFFICULT AT ALL
5. POOR APPETITE OR OVEREATING: NOT AT ALL
SUM OF ALL RESPONSES TO PHQ QUESTIONS 1-9: 0
1. LITTLE INTEREST OR PLEASURE IN DOING THINGS: NOT AT ALL
7. TROUBLE CONCENTRATING ON THINGS, SUCH AS READING THE NEWSPAPER OR WATCHING TELEVISION: NOT AT ALL
SUM OF ALL RESPONSES TO PHQ QUESTIONS 1-9: 0
2. FEELING DOWN, DEPRESSED OR HOPELESS: NOT AT ALL

## 2025-04-16 ASSESSMENT — LIFESTYLE VARIABLES
HOW OFTEN DO YOU HAVE A DRINK CONTAINING ALCOHOL: MONTHLY OR LESS
HOW MANY STANDARD DRINKS CONTAINING ALCOHOL DO YOU HAVE ON A TYPICAL DAY: 1 OR 2

## 2025-04-16 NOTE — PROGRESS NOTES
Medicare Annual Wellness Visit    Rosales Bui is here for Medicare AW    Assessment & Plan   Medicare annual wellness visit, subsequent     Return in 1 year (on 4/16/2026) for Medicare AW.     Subjective     Patient's complete Health Risk Assessment and screening values have been reviewed and are found in Flowsheets. The following problems were reviewed today and where indicated follow up appointments were made and/or referrals ordered.    Positive Risk Factor Screenings with Interventions:              Inactivity:  On average, how many days per week do you engage in moderate to strenuous exercise (like a brisk walk)?: 1 day (\"every now and then\") (!) Abnormal  On average, how many minutes do you engage in exercise at this level?: 20 min  Interventions:  Patient comments: Patient's wife is quite active with walking and tries to get patient included.  He occasionally joins her but \"not as often as he should.\"  Encouraged to join her more frequently.     Abnormal BMI (obese):  There is no height or weight on file to calculate BMI. (!) Abnormal  Interventions:  Patient declines any further evaluation or treatment                   Objective    Patient-Reported Vitals  Patient-Reported Systolic (Top): 132 mmHg  Patient-Reported Diastolic (Bottom): 80 mmHg                 Allergies   Allergen Reactions    Penicillins Anaphylaxis    Metformin Other (See Comments)     Flank pain     Prior to Visit Medications    Medication Sig Taking? Authorizing Provider   Insulin Pen Needle ([employer] PEN NEEDLES) 31G X 5 MM MISC USE TO INJECT INSULIN ONCE NIGHTLY Yes Marcin Sousa, DO   insulin glargine (LANTUS SOLOSTAR) 100 UNIT/ML injection pen Inject 30 Units into the skin nightly Yes Marcin Sousa DO   pantoprazole (PROTONIX) 20 MG tablet TAKE 1 TABLET BY MOUTH DAILY Yes Marcin Sousa, DO   blood glucose test strips (ONETOUCH ULTRA) strip USE 1 STRIP TO TEST DAILY Yes Marcin Sousa, DO   SITagliptin

## 2025-04-16 NOTE — PATIENT INSTRUCTIONS
Learning About Being Active as an Older Adult  Why is being active important as you get older?     Being active is one of the best things you can do for your health. And it's never too late to start. Being active--or getting active, if you aren't already--has definite benefits. It can:  Give you more energy,  Keep your mind sharp.  Improve balance to reduce your risk of falls.  Help you manage chronic illness with fewer medicines.  No matter how old you are, how fit you are, or what health problems you have, there is a form of activity that will work for you. And the more physical activity you can do, the better your overall health will be.  What kinds of activity can help you stay healthy?  Being more active will make your daily activities easier. Physical activity includes planned exercise and things you do in daily life. There are four types of activity:  Aerobic.  Doing aerobic activity makes your heart and lungs strong.  Includes walking, dancing, and gardening.  Aim for at least 2½ hours spread throughout the week.  It improves your energy and can help you sleep better.  Muscle-strengthening.  This type of activity can help maintain muscle and strengthen bones.  Includes climbing stairs, using resistance bands, and lifting or carrying heavy loads.  Aim for at least twice a week.  It can help protect the knees and other joints.  Stretching.  Stretching gives you better range of motion in joints and muscles.  Includes upper arm stretches, calf stretches, and gentle yoga.  Aim for at least twice a week, preferably after your muscles are warmed up from other activities.  It can help you function better in daily life.  Balancing.  This helps you stay coordinated and have good posture.  Includes heel-to-toe walking, kj chi, and certain types of yoga.  Aim for at least 3 days a week.  It can reduce your risk of falling.  Even if you have a hard time meeting the recommendations, it's better to be more active

## 2025-04-20 RX ORDER — LEVOTHYROXINE SODIUM 50 UG/1
50 TABLET ORAL DAILY
Qty: 30 TABLET | Refills: 1 | Status: SHIPPED | OUTPATIENT
Start: 2025-04-20

## 2025-05-12 ENCOUNTER — TELEPHONE (OUTPATIENT)
Dept: FAMILY MEDICINE CLINIC | Age: 80
End: 2025-05-12

## 2025-05-12 NOTE — TELEPHONE ENCOUNTER
Patient would like to discontinue Januvia and be put back on Jardiance.   Januvia does not enable him to keep his glucose numbers or weight consistent.

## 2025-05-13 NOTE — TELEPHONE ENCOUNTER
Patient was called to let him know about Jardiance being at the pharmacy.  No answer, no voicemail.

## 2025-05-26 RX ORDER — RIVAROXABAN 20 MG/1
20 TABLET, FILM COATED ORAL DAILY
Qty: 30 TABLET | Refills: 0 | Status: SHIPPED | OUTPATIENT
Start: 2025-05-26

## 2025-06-24 ASSESSMENT — ENCOUNTER SYMPTOMS: SHORTNESS OF BREATH: 0

## 2025-06-24 NOTE — PROGRESS NOTES
Subjective:      Patient ID: Rosales Bui is a 79 y.o. male.    Diabetes  He presents for his follow-up diabetic visit. He has type 2 diabetes mellitus. The initial diagnosis of diabetes was made 10 years ago. His disease course has been stable. Hypoglycemia symptoms include nervousness/anxiousness. Pertinent negatives for diabetes include no chest pain, no foot paresthesias, no foot ulcerations, no polydipsia and no polyuria. There are no hypoglycemic complications. Symptoms are stable. There are no diabetic complications. Risk factors for coronary artery disease include diabetes mellitus, dyslipidemia, family history, male sex and obesity. Current diabetic treatment includes insulin injections and oral agent (monotherapy). He is compliant with treatment all of the time. His weight is stable. He is following a generally healthy diet. He participates in exercise intermittently. There is no change in his home blood glucose trend. His overall blood glucose range is 110-130 mg/dl. An ACE inhibitor/angiotensin II receptor blocker is not being taken. Eye exam is current (April 2025).      Hyperlipidemia:  Patient is tolerating and compliant with Simvastatin 40 mg daily.  His last lipid check was on 10-24-24 and was at goal for LDL.       Hypothyroidism:  Patient is tolerating and compliant with Synthroid 50 mcg daily.  His TSH was normal on 10-24-24.      Depression / Anxiety: Patient is tolerating and compliant with Celexa 40 mg 1/2 daily.  He feels that the medication is helping with his symptoms.  He is sleeping well and denies any suicidal ideation.  He feels that he still needs to be on the medication.       GERD:  Patient is tolerating and compliant with Protonix 20 mg daily.  He feels that the medication completely controls his symptoms.      Chronic Atrial Fibrillation:  Patient sees Dr. Fuentes / [facility] and is S/P pacemaker insertion.  He takes Xarelto 20 mg daily and Toprol XL 50 mg daily.      Obesity:

## 2025-06-26 ENCOUNTER — OFFICE VISIT (OUTPATIENT)
Dept: FAMILY MEDICINE CLINIC | Age: 80
End: 2025-06-26

## 2025-06-26 VITALS
BODY MASS INDEX: 37.24 KG/M2 | DIASTOLIC BLOOD PRESSURE: 72 MMHG | HEIGHT: 73 IN | WEIGHT: 281 LBS | SYSTOLIC BLOOD PRESSURE: 108 MMHG

## 2025-06-26 DIAGNOSIS — E78.00 HYPERCHOLESTEROLEMIA: ICD-10-CM

## 2025-06-26 DIAGNOSIS — E66.09 NON MORBID OBESITY DUE TO EXCESS CALORIES: ICD-10-CM

## 2025-06-26 DIAGNOSIS — E11.9 TYPE 2 DIABETES MELLITUS WITHOUT COMPLICATION, WITHOUT LONG-TERM CURRENT USE OF INSULIN (HCC): Primary | ICD-10-CM

## 2025-06-26 DIAGNOSIS — F41.9 ANXIETY: ICD-10-CM

## 2025-06-26 DIAGNOSIS — F32.4 MAJOR DEPRESSION SINGLE EPISODE, IN PARTIAL REMISSION: ICD-10-CM

## 2025-06-26 DIAGNOSIS — I48.20 CHRONIC ATRIAL FIBRILLATION (HCC): ICD-10-CM

## 2025-06-26 DIAGNOSIS — E03.9 HYPOTHYROIDISM (ACQUIRED): ICD-10-CM

## 2025-06-26 DIAGNOSIS — K21.9 GERD WITHOUT ESOPHAGITIS: ICD-10-CM

## 2025-06-26 LAB
CREAT UR-MCNC: 95 MG/DL (ref 39–259)
HBA1C MFR BLD: 6.6 %
MICROALBUMIN UR DL<=1MG/L-MCNC: <1.2 MG/DL
MICROALBUMIN/CREAT UR: NORMAL MG/G (ref 0–30)

## 2025-06-27 ENCOUNTER — RESULTS FOLLOW-UP (OUTPATIENT)
Dept: FAMILY MEDICINE CLINIC | Age: 80
End: 2025-06-27

## 2025-07-01 RX ORDER — RIVAROXABAN 20 MG/1
20 TABLET, FILM COATED ORAL DAILY
Qty: 30 TABLET | Refills: 3 | Status: SHIPPED | OUTPATIENT
Start: 2025-07-01

## 2025-07-01 NOTE — TELEPHONE ENCOUNTER
Last office visit 6/26/2025       Next office visit scheduled 10/27/2025    Requested Prescriptions     Pending Prescriptions Disp Refills    XARELTO 20 MG TABS tablet [Pharmacy Med Name: XARELTO 20 MG TABLET] 30 tablet 0     Sig: TAKE 1 TABLET BY MOUTH DAILY

## 2025-07-02 RX ORDER — CITALOPRAM HYDROBROMIDE 40 MG/1
20 TABLET ORAL DAILY
Qty: 30 TABLET | Refills: 1 | Status: SHIPPED | OUTPATIENT
Start: 2025-07-02

## 2025-07-02 NOTE — TELEPHONE ENCOUNTER
Last office visit 6/26/2025     Next office visit scheduled 10/27/2025    Requested Prescriptions     Pending Prescriptions Disp Refills    citalopram (CELEXA) 40 MG tablet [Pharmacy Med Name: CITALOPRAM HBR 40 MG TABLET] 30 tablet 3     Sig: TAKE 1/2 TABLET BY MOUTH DAILY

## 2025-07-18 NOTE — TELEPHONE ENCOUNTER
Last office visit 6/26/2025       Next office visit scheduled 10/27/2025    Requested Prescriptions     Pending Prescriptions Disp Refills    levothyroxine (SYNTHROID) 50 MCG tablet [Pharmacy Med Name: LEVOTHYROXINE 50 MCG TABLET] 30 tablet 1     Sig: TAKE 1 TABLET BY MOUTH DAILY

## 2025-07-20 RX ORDER — LEVOTHYROXINE SODIUM 50 UG/1
50 TABLET ORAL DAILY
Qty: 30 TABLET | Refills: 3 | Status: SHIPPED | OUTPATIENT
Start: 2025-07-20

## 2025-08-17 RX ORDER — INSULIN GLARGINE 100 [IU]/ML
INJECTION, SOLUTION SUBCUTANEOUS
Qty: 15 ML | Refills: 1 | Status: SHIPPED | OUTPATIENT
Start: 2025-08-17